# Patient Record
Sex: FEMALE | Race: WHITE | Employment: OTHER | URBAN - METROPOLITAN AREA
[De-identification: names, ages, dates, MRNs, and addresses within clinical notes are randomized per-mention and may not be internally consistent; named-entity substitution may affect disease eponyms.]

---

## 2017-01-10 ENCOUNTER — TELEPHONE (OUTPATIENT)
Dept: ENDOCRINOLOGY | Age: 77
End: 2017-01-10

## 2017-01-10 ENCOUNTER — HOSPITAL ENCOUNTER (OUTPATIENT)
Dept: LAB | Age: 77
Discharge: HOME | End: 2017-01-10
Payer: MEDICARE

## 2017-01-10 DIAGNOSIS — E11.65 UNCONTROLLED TYPE 2 DIABETES MELLITUS WITH HYPERGLYCEMIA, WITH LONG-TERM CURRENT USE OF INSULIN (HCC): Primary | ICD-10-CM

## 2017-01-10 DIAGNOSIS — Z79.4 UNCONTROLLED TYPE 2 DIABETES MELLITUS WITH HYPERGLYCEMIA, WITH LONG-TERM CURRENT USE OF INSULIN (HCC): Primary | ICD-10-CM

## 2017-01-10 PROCEDURE — 80053 COMPREHEN METABOLIC PANEL: CPT

## 2017-01-10 PROCEDURE — 36415 COLL VENOUS BLD VENIPUNCTURE: CPT

## 2017-01-10 PROCEDURE — 80061 LIPID PANEL: CPT

## 2017-01-10 PROCEDURE — 83036 HEMOGLOBIN GLYCOSYLATED A1C: CPT

## 2017-01-10 PROCEDURE — 82043 UR ALBUMIN QUANTITATIVE: CPT

## 2017-01-11 LAB
ALBUMIN SERPL-MCNC: 3.6 G/DL (ref 3.5–4.8)
ALBUMIN/CREAT UR: 34.2 MG/G CREAT (ref 0–30)
ALBUMIN/GLOB SERPL: 0.9 {RATIO} (ref 1.1–2.5)
ALP SERPL-CCNC: 98 IU/L (ref 39–117)
ALT SERPL-CCNC: 15 IU/L (ref 0–32)
AST SERPL-CCNC: 22 IU/L (ref 0–40)
BILIRUB SERPL-MCNC: 0.4 MG/DL (ref 0–1.2)
BUN SERPL-MCNC: 16 MG/DL (ref 8–27)
BUN/CREAT SERPL: 16 (ref 11–26)
CALCIUM SERPL-MCNC: 9.4 MG/DL (ref 8.7–10.3)
CHLORIDE SERPL-SCNC: 97 MMOL/L (ref 96–106)
CHOLEST SERPL-MCNC: 204 MG/DL (ref 100–199)
CO2 SERPL-SCNC: 27 MMOL/L (ref 18–29)
CREAT SERPL-MCNC: 1.03 MG/DL (ref 0.57–1)
CREAT UR-MCNC: 126.8 MG/DL
EST. AVERAGE GLUCOSE BLD GHB EST-MCNC: 252 MG/DL
GLOBULIN SER CALC-MCNC: 3.8 G/DL (ref 1.5–4.5)
GLUCOSE SERPL-MCNC: 269 MG/DL (ref 65–99)
HBA1C MFR BLD: 10.4 % (ref 4.8–5.6)
HDLC SERPL-MCNC: 38 MG/DL
INTERPRETATION, 910389: NORMAL
INTERPRETATION: NORMAL
LDLC SERPL CALC-MCNC: 139 MG/DL (ref 0–99)
MICROALBUMIN UR-MCNC: 43.4 UG/ML
POTASSIUM SERPL-SCNC: 5.7 MMOL/L (ref 3.5–5.2)
PROT SERPL-MCNC: 7.4 G/DL (ref 6–8.5)
SODIUM SERPL-SCNC: 139 MMOL/L (ref 134–144)
TRIGL SERPL-MCNC: 133 MG/DL (ref 0–149)
VLDLC SERPL CALC-MCNC: 27 MG/DL (ref 5–40)

## 2017-01-17 ENCOUNTER — OFFICE VISIT (OUTPATIENT)
Dept: ENDOCRINOLOGY | Age: 77
End: 2017-01-17

## 2017-01-17 VITALS
OXYGEN SATURATION: 98 % | DIASTOLIC BLOOD PRESSURE: 76 MMHG | WEIGHT: 262 LBS | TEMPERATURE: 98.2 F | HEIGHT: 62 IN | HEART RATE: 76 BPM | SYSTOLIC BLOOD PRESSURE: 171 MMHG | RESPIRATION RATE: 16 BRPM | BODY MASS INDEX: 48.21 KG/M2

## 2017-01-17 DIAGNOSIS — Z79.4 TYPE 2 DIABETES MELLITUS WITH HYPERGLYCEMIA, WITH LONG-TERM CURRENT USE OF INSULIN (HCC): Primary | ICD-10-CM

## 2017-01-17 DIAGNOSIS — E11.65 TYPE 2 DIABETES MELLITUS WITH HYPERGLYCEMIA, WITH LONG-TERM CURRENT USE OF INSULIN (HCC): Primary | ICD-10-CM

## 2017-01-17 DIAGNOSIS — E11.65 TYPE 2 DIABETES MELLITUS WITH HYPERGLYCEMIA, WITHOUT LONG-TERM CURRENT USE OF INSULIN (HCC): ICD-10-CM

## 2017-01-17 DIAGNOSIS — Z79.4 UNCONTROLLED TYPE 2 DIABETES MELLITUS WITH HYPERGLYCEMIA, WITH LONG-TERM CURRENT USE OF INSULIN (HCC): ICD-10-CM

## 2017-01-17 DIAGNOSIS — E11.65 UNCONTROLLED TYPE 2 DIABETES MELLITUS WITH HYPERGLYCEMIA, WITH LONG-TERM CURRENT USE OF INSULIN (HCC): ICD-10-CM

## 2017-01-17 DIAGNOSIS — E11.00 UNCONTROLLED TYPE 2 DIABETES MELLITUS WITH HYPEROSMOLARITY WITHOUT COMA, UNSPECIFIED LONG TERM INSULIN USE STATUS: ICD-10-CM

## 2017-01-17 DIAGNOSIS — I10 ESSENTIAL HYPERTENSION: ICD-10-CM

## 2017-01-17 RX ORDER — METFORMIN HYDROCHLORIDE 1000 MG/1
1000 TABLET ORAL 2 TIMES DAILY WITH MEALS
Qty: 60 TAB | Refills: 5 | Status: SHIPPED | OUTPATIENT
Start: 2017-01-17 | End: 2017-12-13 | Stop reason: SDUPTHER

## 2017-01-17 NOTE — PROGRESS NOTES
Eye exam: 4 years ago  Foot exam: not within last year    Lab Results   Component Value Date/Time    Hemoglobin A1c 10.4 01/10/2017 09:29 AM    Hemoglobin A1c (POC) 10.6 09/01/2016 10:08 AM    Hemoglobin A1c, External 9.3 12/02/2015     Wt Readings from Last 3 Encounters:   01/17/17 262 lb (118.8 kg)   10/14/16 250 lb 6.4 oz (113.6 kg)   09/01/16 252 lb (114.3 kg)     Temp Readings from Last 3 Encounters:   01/17/17 98.2 °F (36.8 °C) (Oral)   10/14/16 95.7 °F (35.4 °C) (Oral)   09/01/16 96.3 °F (35.7 °C)     BP Readings from Last 3 Encounters:   01/17/17 171/76   10/14/16 147/77   09/01/16 153/66     Pulse Readings from Last 3 Encounters:   01/17/17 76   10/14/16 72   09/01/16 72

## 2017-01-17 NOTE — PATIENT INSTRUCTIONS
Start Truliicty 0.75 mg weekly     Start Metformin    Levemir 60 units in AM and 60 units at bedtime    Check sugars 4 times a day     Stop Humalog     Call in 2 weeks with sugar readings     Bring medicine bottles to your visit

## 2017-01-17 NOTE — MR AVS SNAPSHOT
Visit Information Date & Time Provider Department Dept. Phone Encounter #  
 1/17/2017 11:15 AM Hallie Walls MD [facility] [phone] [id number] Follow-up Instructions Return in about 2 months (around 3/17/2017). Upcoming Health Maintenance Date Due  
 FOOT EXAM Q1 8/22/1950 EYE EXAM RETINAL OR DILATED Q1 8/22/1952 DTaP/Tdap/Td series (1 - Tdap) 8/22/1963 FOBT Q 1 YEAR AGE 50-75 8/22/1992 ZOSTER VACCINE AGE 60> 8/22/2002 GLAUCOMA SCREENING Q2Y 8/22/2007 OSTEOPOROSIS SCREENING (DEXA) 8/22/2007 Pneumococcal 65+ Low/Medium Risk (1 of 2 - PCV13) 8/22/2007 MEDICARE YEARLY EXAM 8/22/2007 INFLUENZA AGE 9 TO ADULT 8/1/2016 HEMOGLOBIN A1C Q6M 7/10/2017 MICROALBUMIN Q1 1/10/2018 LIPID PANEL Q1 1/10/2018 Allergies as of 1/17/2017  Review Complete On: 1/17/2017 By: Hallie Walls MD  
  
 Severity Noted Reaction Type Reactions Lortab [Hydrocodone-acetaminophen] High 09/17/2014    Other (comments)  
 hallucinations Phenergan [Promethazine] High 01/27/2015    Other (comments) Hallucination Cephalexin  05/16/2016    Vertigo Oxycodone  09/17/2014    Other (comments)  
 hallucination Current Immunizations  Never Reviewed No immunizations on file. Not reviewed this visit You Were Diagnosed With   
  
 Codes Comments Type 2 diabetes mellitus with hyperglycemia, with long-term current use of insulin (HCC)    -  Primary ICD-10-CM: E11.65, Z79.4 ICD-9-CM: 250.00, 790.29, V58.67 Essential hypertension     ICD-10-CM: I10 
ICD-9-CM: 401.9 Vitals BP Pulse Temp Resp Height(growth percentile) Weight(growth percentile) 171/76 (BP 1 Location: Right arm, BP Patient Position: Sitting) 76 98.2 °F (36.8 °C) (Oral) 16 5' 2\" (1.575 m) 262 lb (118.8 kg) SpO2 BMI OB Status Smoking Status 98% 47.92 kg/m2 Menopause Never Smoker Vitals History BMI and BSA Data Body Mass Index Body Surface Area  
 47.92 kg/m 2 2.28 m 2 Preferred Pharmacy Pharmacy Name Phone [facility], [address] Your Updated Medication List  
  
   
This list is accurate as of: 1/17/17 12:05 PM.  Always use your most recent med list.  
  
  
  
  
 ASPERCREME 10 % lotion Generic drug:  trolamine salicylate Apply  to affected area. aspirin delayed-release 81 mg tablet Take 81 mg by mouth daily. BD INSULIN PEN NEEDLE UF MINI 31 gauge x 3/16\" Ndle Generic drug:  Insulin Needles (Disposable) USE TO INJECT INSULI SUBCUTANEOUSLY DAILY * Blood-Glucose Meter monitoring kit Test blood glucose 3 times daily * Blood-Glucose Meter monitoring kit Commonly known as:  Roger Mcardle Test blood glucose 3 times daily  
  
 carvedilol 3.125 mg tablet Commonly known as:  Abbott Servant Take  by mouth two (2) times daily (with meals). CENTRUM SILVER PO Take  by mouth. DIOVAN 80 mg tablet Generic drug:  valsartan Take 80 mg by mouth daily. dulaglutide 0.75 mg/0.5 mL sub-q pen Commonly known as:  TRULICITY  
0.5 mL by SubCUTAneous route every seven (7) days. furosemide 40 mg tablet Commonly known as:  LASIX Take 40 mg by mouth daily. glucose blood VI test strips strip Commonly known as:  ONETOUCH ULTRA TEST Test blood glucose 3 times daily Dx Code: E11.65  
  
 insulin detemir 100 unit/mL (3 mL) Inpn Commonly known as:  Winton Melter Inject 80 units at bedtime  
  
 insulin lispro 100 unit/mL kwikpen Commonly known as:  HumaLOG KwikPen Inject 25 units before each meal w/ SSI Max units daily: 111 Lancets Misc Test blood glucose 3 times daily  
  
 meclizine 25 mg tablet Commonly known as:  ANTIVERT Take  by mouth three (3) times daily as needed. metFORMIN 1,000 mg tablet Commonly known as:  GLUCOPHAGE  
 Take 1 Tab by mouth two (2) times daily (with meals). metoprolol succinate 25 mg XL tablet Commonly known as:  TOPROL-XL Take 25 mg by mouth daily. naproxen 250 mg tablet Commonly known as:  NAPROSYN Take 250 mg by mouth two (2) times daily as needed for Pain.  
  
 pravastatin 40 mg tablet Commonly known as:  PRAVACHOL Take 40 mg by mouth nightly. silver sulfADIAZINE 1 % topical cream  
Commonly known as:  SILVADENE Apply  to affected area daily. traMADol 50 mg tablet Commonly known as:  ULTRAM  
Take 50 mg by mouth every six (6) hours as needed for Pain. * Notice: This list has 2 medication(s) that are the same as other medications prescribed for you. Read the directions carefully, and ask your doctor or other care provider to review them with you. Follow-up Instructions Return in about 2 months (around 3/17/2017). Patient Instructions Start Truliicty 0.75 mg weekly Start Metformin Levemir 60 units in AM and 60 units at bedtime Check sugars 4 times a day Stop Humalog Call in 2 weeks with sugar readings Bring medicine bottles to your visit Introducing [facility]! [facility] introduces [employer] patient portal. Now you can access parts of your medical record, email your doctor's office, and request medication refills online. 1. In your internet browser, go to [email or web address]. [email or web address] 2. Click on the First Time User? Click Here link in the Sign In box. You will see the New Member Sign Up page. 3. Enter your [employer] Access Code exactly as it appears below. You will not need to use this code after youve completed the sign-up process. If you do not sign up before the expiration date, you must request a new code. · [employer] Access Code: [email or web address] Expires: 4/17/2017 12:05 PM 
 
4.  Enter the last four digits of your Social Security Number (xxxx) and Date of Birth (mm/dd/yyyy) as indicated and click Submit. You will be taken to the next sign-up page. 5. Create a [employer] ID. This will be your [employer] login ID and cannot be changed, so think of one that is secure and easy to remember. 6. Create a [employer] password. You can change your password at any time. 7. Enter your Password Reset Question and Answer. This can be used at a later time if you forget your password. 8. Enter your e-mail address. You will receive e-mail notification when new information is available in [address] 9. Click Sign Up. You can now view and download portions of your medical record. 10. Click the Download Summary menu link to download a portable copy of your medical information. If you have questions, please visit the Frequently Asked Questions section of the [employer] website. Remember, [employer] is NOT to be used for urgent needs. For medical emergencies, dial 911. Now available from your iPhone and Android! Please provide this summary of care documentation to your next provider. Your primary care clinician is listed as Juana Cox. If you have any questions after today's visit, please call [phone].

## 2017-01-17 NOTE — PROGRESS NOTES
[facility]               Jorge Andrew MD        [address] 78 [phone] Fax [phone] ( [facility]) [address] [address] [address] NR:[phone] Fax [phone] ( Monday)          Patient Information  Date:1/17/2017  Name : Eliot Douglas 78 y.o.     YOB: 1940         Referred by: Nancy Thompson NP         Chief Complaint   Patient presents with    Diabetes       History of Present Illness: Eliot Douglas is a 78 y.o. female here for follow up of  Type 2 Diabetes Mellitus. Last visit I had discussed starting Trulicity and sent prescription to pharmacy. Instructions given to continue Trulicity. She took it for one month and says that she did not know she had to continue it. She is not taking Levemir  whenever she feels good. She takes Levemir when she has cramping. Humalog should have been  30 units AC, she is taking 10 units as she has muscle cramps 30-60 minutes after taking Humalog. She reports having a lot of Humalog left in the house. She has always been taking less than that recommended. I reviewed the log and her blood glucose are in the 200-500 range. In the past, she had mentioned being unable to afford a healthy diet and then on a high carb diet. She has history of lymphedema and follows up with lymphedema clinic. No regular exercise.              Wt Readings from Last 3 Encounters:   01/17/17 262 lb (118.8 kg)   10/14/16 250 lb 6.4 oz (113.6 kg)   09/01/16 252 lb (114.3 kg)       BP Readings from Last 3 Encounters:   01/17/17 171/76   10/14/16 147/77   09/01/16 153/66           Past Medical History   Diagnosis Date    HTN (hypertension)     Hyperlipidemia     Hypothyroid     Lymphedema     Type II or unspecified type diabetes mellitus with neurological manifestations, uncontrolled      Current Outpatient Prescriptions   Medication Sig    insulin detemir (LEVEMIR FLEXTOUCH) 100 unit/mL (3 mL) inpn Inject 60 units BID    traMADol (ULTRAM) 50 mg tablet Take 50 mg by mouth every six (6) hours as needed for Pain.  meclizine (ANTIVERT) 25 mg tablet Take  by mouth three (3) times daily as needed.  trolamine salicylate (ASPERCREME) 10 % lotion Apply  to affected area.  FOLIC ACID/MULTIVIT-MIN/LUTEIN (CENTRUM SILVER PO) Take  by mouth.  glucose blood VI test strips (ONETOUCH ULTRA TEST) strip Test blood glucose 3 times daily Dx Code: E11.65    BD INSULIN PEN NEEDLE UF MINI 31 gauge x 3/16\" ndle USE TO INJECT INSULI SUBCUTANEOUSLY DAILY    naproxen (NAPROSYN) 250 mg tablet Take 250 mg by mouth two (2) times daily as needed for Pain.  Blood-Glucose Meter (ONE TOUCH ULTRAMINI) monitoring kit Test blood glucose 3 times daily    Blood-Glucose Meter monitoring kit Test blood glucose 3 times daily    Lancets misc Test blood glucose 3 times daily    furosemide (LASIX) 40 mg tablet Take 40 mg by mouth daily.  aspirin delayed-release 81 mg tablet Take 81 mg by mouth daily.  pravastatin (PRAVACHOL) 40 mg tablet Take 40 mg by mouth nightly.  metFORMIN (GLUCOPHAGE) 1,000 mg tablet Take 1 Tab by mouth two (2) times daily (with meals).  dulaglutide (TRULICITY) 8.94 PT/1.0 mL sub-q pen 0.5 mL by SubCUTAneous route every seven (7) days.  valsartan (DIOVAN) 80 mg tablet Take 80 mg by mouth daily. No current facility-administered medications for this visit.       Allergies   Allergen Reactions    Lortab [Hydrocodone-Acetaminophen] Other (comments)     hallucinations    Phenergan [Promethazine] Other (comments)     Hallucination    Cephalexin Vertigo    Oxycodone Other (comments)     hallucination         Review of Systems:  - Constitutional Symptoms: no fevers, no chills  - Eyes: no blurry vision no double vision  - Cardiovascular: no chest pain ,no palpitations  - Respiratory: no cough no shortness of breath  - Gastrointestinal: no dysphagia no  abdominal pain  - Musculoskeletal: no joint pains +  weakness  - Integumentary: no rashes  - Neurological: + numbness, tingling, no  headaches  -     Physical Examination:   Blood pressure 171/76, pulse 76, temperature 98.2 °F (36.8 °C), temperature source Oral, resp. rate 16, height 5' 2\" (1.575 m), weight 262 lb (118.8 kg), SpO2 98 %. Estimated body mass index is 47.92 kg/(m^2) as calculated from the following:    Height as of this encounter: 5' 2\" (1.575 m). -   Weight as of this encounter: 262 lb (118.8 kg). - General: pleasant, no distress, good eye contact,obese  - HEENT: no pallor, no periorbital edema, EOMI  - Neck: supple, no thyromegaly  - Cardiovascular: regular, normal rate, normal S1 and S2  - Respiratory: clear to auscultation bilaterally  - Gastrointestinal: soft, nontender, nondistended,  BS +  - Musculoskeletal:  no acanthosis nigricans,+ edema,   - Foot exam -:lymphedema   - Neurological: alert and oriented  - Psychiatric: normal mood and affect  - Skin: color, texture, turgor normal.       Data Reviewed:     [] Glucose records reviewed. [] See glucose records for details (to be scanned).   [] A1C  [x] Reviewed labs    Creatinine normal, A1 C  Lab Results   Component Value Date/Time    Hemoglobin A1c 10.4 01/10/2017 09:29 AM    Hemoglobin A1c 10.9 01/20/2015 09:52 AM    Glucose 269 01/10/2017 09:29 AM    Glucose (POC) 158 03/18/2015 12:08 PM    Glucose  09/01/2016 10:03 AM    Microalb/Creat ratio (ug/mg creat.) 34.2 01/10/2017 09:29 AM    LDL, calculated 139 01/10/2017 09:29 AM    Creatinine 1.03 01/10/2017 09:29 AM    Hemoglobin A1c, External 9.3 12/02/2015    Hemoglobin A1c, External 10.6 05/07/2015 12:39 PM      Lab Results   Component Value Date/Time    Cholesterol, total 204 01/10/2017 09:29 AM    HDL Cholesterol 38 01/10/2017 09:29 AM    LDL, calculated 139 01/10/2017 09:29 AM    Triglyceride 133 01/10/2017 09:29 AM     Lab Results   Component Value Date/Time    ALT 15 01/10/2017 09:29 AM AST 22 01/10/2017 09:29 AM    Alk. phosphatase 98 01/10/2017 09:29 AM    Bilirubin, total 0.4 01/10/2017 09:29 AM     Lab Results   Component Value Date/Time    GFR est AA 62 01/10/2017 09:29 AM    GFR est non-AA 54 01/10/2017 09:29 AM    Creatinine 1.03 01/10/2017 09:29 AM    BUN 16 01/10/2017 09:29 AM    Sodium 139 01/10/2017 09:29 AM    Potassium 5.7 01/10/2017 09:29 AM    Chloride 97 01/10/2017 09:29 AM    CO2 27 01/10/2017 09:29 AM      No results found for: TSH, TSHEXT     Assessment/Plan:     1. Type 2 diabetes mellitus with hyperglycemia, with long-term current use of insulin ([facility]    2. Essential hypertension    3. Uncontrolled type 2 diabetes mellitus with hyperosmolarity without coma, unspecified long term insulin use status ([facility]        1. Type 2 Diabetes Mellitus with nephropathy,uncontrolled  Lab Results   Component Value Date/Time    Hemoglobin A1c 10.4 01/10/2017 09:29 AM    Hemoglobin A1c (POC) 10.6 09/01/2016 10:08 AM    Hemoglobin A1c, External 9.3 12/02/2015       She has always been taking less Insulin than  recommended, I have always given printed instructions. Despite that, she does not follow the recommendations. She has not been taking Trulicity. She is taking Levemir only when she feels bad. Humalog was taken less than what was prescribed. She had nonspecific side effects to Humalog and did not want to be changed to Novolog. She agreed to try Trulicity once a week and Levemir twice daily. Discussed she needs an effort for better control. Metformin 1000 mg twice a day   Levemir 60 units BID   Stop Humalog  30 units TID  Start Trulicity - discussed side effects, discontinued Bydureon   Stressed the importance of following the recomendations  Check sugars 4 times a day   To call in 3 weeks      Advised to check glucose 4 times daily    2. HTN : Continue current therapy  Her BP was high here.  When discussed medications, she has stopped all three BP medications and does not know why it was stopped. Reports to be taking another BP medication which I do not have. Asked to bring the medication bottles with her visit every time. Given the list of medications she was on and asked her to make a new list of medications. Discussed the risk of stroke. She is a high risk patient and high risk for decompensation given noncompliance with the medication treatment. 3. Hyperlipidemia : Continue statin. 4.Obesity:Body mass index is 47.92 kg/(m^2). GLP1    5 PAD - PCI ,Lymphedema          Patient Instructions   Start Truliicty 0.75 mg weekly     Start Metformin    Levemir 60 units in AM and 60 units at bedtime    Check sugars 4 times a day     Stop Humalog     Call in 2 weeks with sugar readings     Bring medicine bottles to your visit     Follow-up Disposition:  Return in about 2 months (around 3/17/2017). Thank you for allowing me to participate in the care of this patient.     Dora Owens MD

## 2017-01-19 ENCOUNTER — TELEPHONE (OUTPATIENT)
Dept: ENDOCRINOLOGY | Age: 77
End: 2017-01-19

## 2017-01-19 NOTE — TELEPHONE ENCOUNTER
Patient called stating that her blood sugar was 181 this morning and wanted to know if she should take her insulin or not.

## 2017-01-19 NOTE — TELEPHONE ENCOUNTER
Informed pt that she can take her insulin if her BG is 189. Informed pt that low BG is considered 70 or below. Pt verbalized understanding.

## 2017-03-25 RX ORDER — PEN NEEDLE, DIABETIC 31 GX5/16"
NEEDLE, DISPOSABLE MISCELLANEOUS
Qty: 300 PEN NEEDLE | Refills: 11 | Status: SHIPPED | OUTPATIENT
Start: 2017-03-25 | End: 2017-07-17 | Stop reason: SDUPTHER

## 2017-05-08 RX ORDER — PEN NEEDLE, DIABETIC 31 GX5/16"
NEEDLE, DISPOSABLE MISCELLANEOUS
Qty: 300 PEN NEEDLE | Refills: 9 | Status: SHIPPED | OUTPATIENT
Start: 2017-05-08 | End: 2017-07-17 | Stop reason: SDUPTHER

## 2017-05-19 ENCOUNTER — TELEPHONE (OUTPATIENT)
Dept: ENDOCRINOLOGY | Age: 77
End: 2017-05-19

## 2017-05-19 NOTE — TELEPHONE ENCOUNTER
Informed pt's  Jeannette Geronimo that Dr David Holman wanted to inform Mrs Nato Rutledge to make sure she takes her metformin as prescribed daily and if her BG continues to increase she is to bring meter to office to be downloaded. Pt's  verbalized understanding.

## 2017-05-19 NOTE — TELEPHONE ENCOUNTER
Pt called stating her Bg was 195 this AM. She wanted to know what to do. Asked pt if she had taken her 60 units of Levemir this AM and she stated she has not. Asked pt to take her Levemir. When asked, pt states she does take Levemir twice daily, her metformin when she remembers and she did start Trulicity. She was calling to see if she needed to take extra units if Levemir due to her BG being 195.  Pt states she does not know how to get readings from meter

## 2017-05-19 NOTE — TELEPHONE ENCOUNTER
Ask her to take metformin consistently which will help insulin work better     Continue the same dose , if sugars are high need log to get a better idea

## 2017-07-03 ENCOUNTER — TELEPHONE (OUTPATIENT)
Dept: ENDOCRINOLOGY | Age: 77
End: 2017-07-03

## 2017-07-03 DIAGNOSIS — E11.65 TYPE 2 DIABETES MELLITUS WITH HYPERGLYCEMIA, WITH LONG-TERM CURRENT USE OF INSULIN (HCC): Primary | ICD-10-CM

## 2017-07-03 DIAGNOSIS — Z79.4 TYPE 2 DIABETES MELLITUS WITH HYPERGLYCEMIA, WITH LONG-TERM CURRENT USE OF INSULIN (HCC): Primary | ICD-10-CM

## 2017-07-03 RX ORDER — INSULIN DETEMIR 100 [IU]/ML
INJECTION, SOLUTION SUBCUTANEOUS
Qty: 30 ML | Refills: 0 | Status: SHIPPED | OUTPATIENT
Start: 2017-07-03 | End: 2017-09-06 | Stop reason: SDUPTHER

## 2017-07-03 RX ORDER — PEN NEEDLE, DIABETIC 31 GX5/16"
NEEDLE, DISPOSABLE MISCELLANEOUS
Qty: 300 PEN NEEDLE | Refills: 6 | Status: SHIPPED | OUTPATIENT
Start: 2017-07-03 | End: 2017-07-17 | Stop reason: SDUPTHER

## 2017-07-17 RX ORDER — PEN NEEDLE, DIABETIC 31 GX3/16"
NEEDLE, DISPOSABLE MISCELLANEOUS
Qty: 100 PEN NEEDLE | Refills: 11 | Status: SHIPPED | OUTPATIENT
Start: 2017-07-17 | End: 2018-01-01 | Stop reason: SDUPTHER

## 2017-07-17 NOTE — TELEPHONE ENCOUNTER
Patient says she is running out of pen needles for levemir sooner than prescription allows. Patient says she takes levemir twice daily and need at least 200 pen needles for 1 month supply. Send to [employer]. Patient says she is completely out and would like prescription sent before 5pm today(7/17/17).

## 2017-10-23 ENCOUNTER — OFFICE VISIT (OUTPATIENT)
Dept: ENDOCRINOLOGY | Age: 77
End: 2017-10-23

## 2017-10-23 VITALS
HEART RATE: 62 BPM | SYSTOLIC BLOOD PRESSURE: 165 MMHG | TEMPERATURE: 96.4 F | OXYGEN SATURATION: 97 % | HEIGHT: 62 IN | DIASTOLIC BLOOD PRESSURE: 62 MMHG | WEIGHT: 262 LBS | BODY MASS INDEX: 48.21 KG/M2 | RESPIRATION RATE: 16 BRPM

## 2017-10-23 DIAGNOSIS — E78.2 MIXED HYPERLIPIDEMIA: ICD-10-CM

## 2017-10-23 DIAGNOSIS — E11.65 TYPE 2 DIABETES MELLITUS WITH HYPERGLYCEMIA, WITH LONG-TERM CURRENT USE OF INSULIN (HCC): Primary | ICD-10-CM

## 2017-10-23 DIAGNOSIS — Z79.4 TYPE 2 DIABETES MELLITUS WITH HYPERGLYCEMIA, WITH LONG-TERM CURRENT USE OF INSULIN (HCC): Primary | ICD-10-CM

## 2017-10-23 DIAGNOSIS — I10 ESSENTIAL HYPERTENSION: ICD-10-CM

## 2017-10-23 LAB
GLUCOSE POC: 156 MG/DL
HBA1C MFR BLD HPLC: 9.3 %

## 2017-10-23 RX ORDER — CEPHALEXIN 500 MG/1
CAPSULE ORAL
COMMUNITY
End: 2018-02-26 | Stop reason: ALTCHOICE

## 2017-10-23 RX ORDER — METOPROLOL SUCCINATE 25 MG/1
TABLET, EXTENDED RELEASE ORAL
COMMUNITY
End: 2019-01-01 | Stop reason: ALTCHOICE

## 2017-10-23 NOTE — PROGRESS NOTES
Nathaniel Kaminski is a 78 y.o. female here for   Chief Complaint   Patient presents with    Diabetes       Functional glucose monitor and record keeping system? - yes  Eye exam within last year? - 5-6 yrs ago  Foot exam within last year? - Oct 2017    1. Have you been to the ER, urgent care clinic since your last visit? Hospitalized since your last visit? -no    2. Have you seen or consulted any other health care providers outside of the [address] [place] [address] since your last visit? Include any pap smears or colon screening. -PCP      Lab Results   Component Value Date/Time    Hemoglobin A1c 10.4 01/10/2017 09:29 AM    Hemoglobin A1c (POC) 10.6 09/01/2016 10:08 AM    Hemoglobin A1c, External 9.3 12/02/2015       Wt Readings from Last 3 Encounters:   01/17/17 262 lb (118.8 kg)   10/14/16 250 lb 6.4 oz (113.6 kg)   09/01/16 252 lb (114.3 kg)     Temp Readings from Last 3 Encounters:   01/17/17 98.2 °F (36.8 °C) (Oral)   10/14/16 95.7 °F (35.4 °C) (Oral)   09/01/16 96.3 °F (35.7 °C)     BP Readings from Last 3 Encounters:   01/17/17 171/76   10/14/16 147/77   09/01/16 153/66     Pulse Readings from Last 3 Encounters:   01/17/17 76   10/14/16 72   09/01/16 72

## 2017-10-23 NOTE — PROGRESS NOTES
[facility]               Tonja Tate MD        [address] 78 444 74 66 Fax [phone]  [facility]) [address] [address] [address] Z Fax [phone] ( Monday)          Patient Information  Date:10/23/2017  Name : Isaura Bob 78 y.o.     YOB: 1940         Referred by: Tiffanie Lazo NP         Chief Complaint   Patient presents with    Diabetes       History of Present Illness: Isaura Bob is a 78 y.o. female here for follow up of  Type 2 Diabetes Mellitus. Last visit I had discussed starting Trulicity and sent prescription to pharmacy. Instructions given to continue Trulicity. She took it for one month and says that she did not know she had to continue it. She is not taking Levemir  whenever she feels good. She takes Levemir when she has cramping. Humalog should have been  30 units AC, she is taking 10 units as she has muscle cramps 30-60 minutes after taking Humalog. She reports having a lot of Humalog left in the house. She has always been taking less than that recommended. I reviewed the log and her blood glucose are in the 200-500 range. In the past, she had mentioned being unable to afford a healthy diet and then on a high carb diet. She has history of lymphedema and follows up with lymphedema clinic. No regular exercise.              Wt Readings from Last 3 Encounters:   10/23/17 262 lb (118.8 kg)   17 262 lb (118.8 kg)   10/14/16 250 lb 6.4 oz (113.6 kg)       BP Readings from Last 3 Encounters:   10/23/17 165/62   17 171/76   10/14/16 147/77           Past Medical History:   Diagnosis Date    HTN (hypertension)     Hyperlipidemia     Hypothyroid     Lymphedema     Type II or unspecified type diabetes mellitus with neurological manifestations, uncontrolled(250.62) (HCC)      Current Outpatient Prescriptions   Medication Sig    METOLAZONE PO Take by mouth.  cephALEXin (KEFLEX) 500 mg capsule Take 500 mg by mouth BID    metoprolol succinate (TOPROL-XL) 25 mg XL tablet Take 25 mg by mouth daily    Insulin Needles, Disposable, (BD INSULIN PEN NEEDLE UF MINI) 31 gauge x 3/16\" ndle Use to inject Levemir twice daily. Dx code E11.65    metFORMIN (GLUCOPHAGE) 1,000 mg tablet Take 1 Tab by mouth two (2) times daily (with meals).  dulaglutide (TRULICITY) 3.72 AJ/0.2 mL sub-q pen 0.5 mL by SubCUTAneous route every seven (7) days.  insulin detemir (LEVEMIR FLEXTOUCH) 100 unit/mL (3 mL) inpn Inject 60 units BID (Patient taking differently: 65 Units by SubCUTAneous route two (2) times a day. Inject 60 units BID)    traMADol (ULTRAM) 50 mg tablet Take 50 mg by mouth every six (6) hours as needed for Pain.  meclizine (ANTIVERT) 25 mg tablet Take  by mouth three (3) times daily as needed.  trolamine salicylate (ASPERCREME) 10 % lotion Apply  to affected area.  glucose blood VI test strips (ONETOUCH ULTRA TEST) strip Test blood glucose 3 times daily Dx Code: E11.65    naproxen (NAPROSYN) 250 mg tablet Take 250 mg by mouth two (2) times daily as needed for Pain.  Blood-Glucose Meter (ONE TOUCH ULTRAMINI) monitoring kit Test blood glucose 3 times daily    Blood-Glucose Meter monitoring kit Test blood glucose 3 times daily    Lancets misc Test blood glucose 3 times daily    furosemide (LASIX) 40 mg tablet Take 40 mg by mouth daily.  aspirin delayed-release 81 mg tablet Take 81 mg by mouth daily.  pravastatin (PRAVACHOL) 40 mg tablet Take 40 mg by mouth nightly.  LEVEMIR FLEXTOUCH 100 unit/mL (3 mL) inpn INJECT 80 UNITS SUBCUTANEOUSLY AT BEDTIME . Rouse Junk Rouse Junk THIS  IS  A  DOSE  CHANGE. ..    FOLIC ACID/MULTIVIT-MIN/LUTEIN (CENTRUM SILVER PO) Take  by mouth.  valsartan (DIOVAN) 80 mg tablet Take 80 mg by mouth daily. No current facility-administered medications for this visit.       Allergies   Allergen Reactions    Lortab [Hydrocodone-Acetaminophen] Other (comments)     hallucinations    Phenergan [Promethazine] Other (comments)     Hallucination    Cephalexin Vertigo    Oxycodone Other (comments)     hallucination         Review of Systems:  - Constitutional Symptoms: no fevers, no chills  - Eyes: no blurry vision no double vision  - Cardiovascular: no chest pain ,no palpitations  - Respiratory: no cough no shortness of breath  - Gastrointestinal: no dysphagia no  abdominal pain  - Musculoskeletal: no joint pains +  weakness  - Integumentary: no rashes  - Neurological: + numbness, tingling, no  headaches  -     Physical Examination:   Blood pressure 165/62, pulse 62, temperature 96.4 °F (35.8 °C), temperature source Oral, resp. rate 16, height 5' 2\" (1.575 m), weight 262 lb (118.8 kg), SpO2 97 %. Estimated body mass index is 47.92 kg/(m^2) as calculated from the following:    Height as of this encounter: 5' 2\" (1.575 m). -   Weight as of this encounter: 262 lb (118.8 kg). - General: pleasant, no distress, good eye contact,obese  - HEENT: no pallor, no periorbital edema, EOMI  - Neck: supple, no thyromegaly  - Cardiovascular: regular, normal rate, normal S1 and S2  - Respiratory: clear to auscultation bilaterally  - Gastrointestinal: soft, nontender, nondistended,  BS +  - Musculoskeletal:  no acanthosis nigricans,+ edema,   - Foot exam -:lymphedema   - Neurological: alert and oriented  - Psychiatric: normal mood and affect  - Skin: color, texture, turgor normal.       Data Reviewed:     [] Glucose records reviewed. [] See glucose records for details (to be scanned).   [] A1C  [x] Reviewed labs    Creatinine normal, A1 C  Lab Results   Component Value Date/Time    Hemoglobin A1c 10.4 01/10/2017 09:29 AM    Hemoglobin A1c 10.9 01/20/2015 09:52 AM    Glucose 269 01/10/2017 09:29 AM    Glucose (POC) 158 03/18/2015 12:08 PM    Glucose  09/01/2016 10:03 AM    Microalb/Creat ratio (ug/mg creat.) 34.2 01/10/2017 09:29 AM    LDL, calculated 139 01/10/2017 09:29 AM    Creatinine 1.03 01/10/2017 09:29 AM    Hemoglobin A1c, External 9.3 12/02/2015    Hemoglobin A1c, External 10.6 05/07/2015 12:39 PM      Lab Results   Component Value Date/Time    Cholesterol, total 204 01/10/2017 09:29 AM    HDL Cholesterol 38 01/10/2017 09:29 AM    LDL, calculated 139 01/10/2017 09:29 AM    Triglyceride 133 01/10/2017 09:29 AM     Lab Results   Component Value Date/Time    ALT (SGPT) 15 01/10/2017 09:29 AM    AST (SGOT) 22 01/10/2017 09:29 AM    Alk. phosphatase 98 01/10/2017 09:29 AM    Bilirubin, total 0.4 01/10/2017 09:29 AM     Lab Results   Component Value Date/Time    GFR est AA 62 01/10/2017 09:29 AM    GFR est non-AA 54 01/10/2017 09:29 AM    Creatinine 1.03 01/10/2017 09:29 AM    BUN 16 01/10/2017 09:29 AM    Sodium 139 01/10/2017 09:29 AM    Potassium 5.7 01/10/2017 09:29 AM    Chloride 97 01/10/2017 09:29 AM    CO2 27 01/10/2017 09:29 AM      No results found for: TSH, TSHEXT     Assessment/Plan:     1. Type 2 diabetes mellitus with hyperglycemia, with long-term current use of insulin ([address]    2. Mixed hyperlipidemia    3. Essential hypertension        1. Type 2 Diabetes Mellitus with nephropathy,uncontrolled  Lab Results   Component Value Date/Time    Hemoglobin A1c 10.4 01/10/2017 09:29 AM    Hemoglobin A1c (POC) 10.6 09/01/2016 10:08 AM    Hemoglobin A1c, External 9.3 12/02/2015       She has always been taking less Insulin than  recommended, I have always given printed instructions. Despite that, she does not follow the recommendations. She has not been taking Trulicity. She is taking Levemir only when she feels bad. Humalog was taken less than what was prescribed. She had nonspecific side effects to Humalog and did not want to be changed to Novolog. She agreed to try Trulicity once a week and Levemir twice daily. Discussed she needs an effort for better control.       Metformin 1000 mg twice a day   Levemir 60 units BID   Stop Humalog  30 units TID  Start Trulicity - discussed side effects, discontinued Bydureon   Stressed the importance of following the recomendations  Check sugars 4 times a day   To call in 3 weeks      Advised to check glucose 4 times daily    2. HTN : Continue current therapy  Her BP was high here. When discussed medications, she has stopped all three BP medications and does not know why it was stopped. Reports to be taking another BP medication which I do not have. Asked to bring the medication bottles with her visit every time. Given the list of medications she was on and asked her to make a new list of medications. Discussed the risk of stroke. She is a high risk patient and high risk for decompensation given noncompliance with the medication treatment. 3. Hyperlipidemia : Continue statin. 4.Obesity:Body mass index is 47.92 kg/(m^2). GLP1    5 PAD - PCI ,Lymphedema          There are no Patient Instructions on file for this visit. Follow-up Disposition: Not on File    Thank you for allowing me to participate in the care of this patient.     So Palacio MD

## 2017-10-23 NOTE — PROGRESS NOTES
Vincent Hightower MD          Patient Information  Date:10/23/2017  Name : Armand Falnnery 78 y.o.     YOB: 1940         Referred by: Kareen Nobles NP         Chief Complaint   Patient presents with    Diabetes       History of Present Illness: Armand Flannery is a 78 y.o. female here for follow up of  Type 2 Diabetes Mellitus. She has nonspecific side effects to multiple medications, Humalog was discontinued and Trulicity was started. She is taking Trulicity now consistently, Levemir she mostly takes in the morning and evenings and sometimes she forgets. Blood glucose has improved to some extent. A1c is also improved, but not at goal.     She has preulcerative callus and needs surgery, waiting for clearance from the cardiologist. She has infection and intermittently on antibiotics. Blood glucose is fluctuating a lot and sometimes infection can cause severe hyperglycemia. In the past, she had mentioned being unable to afford a healthy diet and then on a high carb diet. She has history of lymphedema and follows up with lymphedema clinic. No regular exercise. 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     cephALEXin (KEFLEX) 500 mg capsule Take 500 mg by mouth BID    metoprolol succinate (TOPROL-XL) 25 mg XL tablet Take 25 mg by mouth daily    Insulin Needles, Disposable, (BD INSULIN PEN NEEDLE UF MINI) 31 gauge x 3/16\" ndle Use to inject Levemir twice daily. Dx code E11.65    metFORMIN (GLUCOPHAGE) 1,000 mg tablet Take 1 Tab by mouth two (2) times daily (with meals).  dulaglutide (TRULICITY) 3.94 VK/2.4 mL sub-q pen 0.5 mL by SubCUTAneous route every seven (7) days.  insulin detemir (LEVEMIR FLEXTOUCH) 100 unit/mL (3 mL) inpn Inject 60 units BID (Patient taking differently: 65 Units by SubCUTAneous route two (2) times a day. Inject 60 units BID)    traMADol (ULTRAM) 50 mg tablet Take 50 mg by mouth every six (6) hours as needed for Pain.  meclizine (ANTIVERT) 25 mg tablet Take  by mouth three (3) times daily as needed.  trolamine salicylate (ASPERCREME) 10 % lotion Apply  to affected area.  glucose blood VI test strips (ONETOUCH ULTRA TEST) strip Test blood glucose 3 times daily Dx Code: E11.65    naproxen (NAPROSYN) 250 mg tablet Take 250 mg by mouth two (2) times daily as needed for Pain.  Blood-Glucose Meter (ONE TOUCH ULTRAMINI) monitoring kit Test blood glucose 3 times daily    Blood-Glucose Meter monitoring kit Test blood glucose 3 times daily    Lancets misc Test blood glucose 3 times daily    furosemide (LASIX) 40 mg tablet Take 40 mg by mouth daily.  aspirin delayed-release 81 mg tablet Take 81 mg by mouth daily.  pravastatin (PRAVACHOL) 40 mg tablet Take 40 mg by mouth nightly.  LEVEMIR FLEXTOUCH 100 unit/mL (3 mL) inpn INJECT 80 UNITS SUBCUTANEOUSLY AT BEDTIME . Ernesto Spies Ernesto Spies THIS  IS  A  DOSE  CHANGE. ..    FOLIC ACID/MULTIVIT-MIN/LUTEIN (CENTRUM SILVER PO) Take  by mouth.  valsartan (DIOVAN) 80 mg tablet Take 80 mg by mouth daily. No current facility-administered medications for this visit.       Allergies   Allergen Reactions    Lortab [Hydrocodone-Acetaminophen] Other (comments)     hallucinations    Phenergan [Promethazine] Other (comments)     Hallucination    Cephalexin Vertigo    Oxycodone Other (comments)     hallucination         Review of Systems:  - Constitutional Symptoms: no fevers, no chills  - Eyes: no blurry vision no double vision  - Cardiovascular: no chest pain ,no palpitations  - Respiratory: no cough no shortness of breath  - Gastrointestinal: no dysphagia no  abdominal pain  - Musculoskeletal: no joint pains +  weakness  - Integumentary: no rashes  - Neurological: + numbness, tingling, no  headaches  -     Physical Examination:   Blood pressure 165/62, pulse 62, temperature 96.4 °F (35.8 °C), temperature source Oral, resp. rate 16, height 5' 2\" (1.575 m), weight 262 lb (118.8 kg), SpO2 97 %. Estimated body mass index is 47.92 kg/(m^2) as calculated from the following:    Height as of this encounter: 5' 2\" (1.575 m). -   Weight as of this encounter: 262 lb (118.8 kg). - General: pleasant, no distress, good eye contact,obese  - HEENT: no pallor, no periorbital edema, EOMI  - Neck: supple, no thyromegaly  - Cardiovascular: regular, normal rate, normal S1 and S2  - Respiratory: clear to auscultation bilaterally  - Gastrointestinal: soft, nontender, nondistended,  BS +  - Musculoskeletal:  no acanthosis nigricans,+ edema,   - Foot exam -:lymphedema , right callus - covered with   - Neurological: alert and oriented  - Psychiatric: normal mood and affect  - Skin: color, texture, turgor normal.       Data Reviewed:     [] Glucose records reviewed. [] See glucose records for details (to be scanned).   [] A1C  [x] Reviewed labs    Creatinine normal, A1 C  Lab Results   Component Value Date/Time    Hemoglobin A1c 10.4 01/10/2017 09:29 AM    Hemoglobin A1c 10.9 01/20/2015 09:52 AM    Glucose 269 01/10/2017 09:29 AM    Glucose (POC) 158 03/18/2015 12:08 PM    Glucose  10/23/2017 04:05 PM    Microalb/Creat ratio (ug/mg creat.) 34.2 01/10/2017 09:29 AM    LDL, calculated 139 01/10/2017 09:29 AM    Creatinine 1.03 01/10/2017 09:29 AM    Hemoglobin A1c, External 9.3 12/02/2015    Hemoglobin A1c, External 10.6 05/07/2015 12:39 PM      Lab Results   Component Value Date/Time Cholesterol, total 204 01/10/2017 09:29 AM    HDL Cholesterol 38 01/10/2017 09:29 AM    LDL, calculated 139 01/10/2017 09:29 AM    Triglyceride 133 01/10/2017 09:29 AM     Lab Results   Component Value Date/Time    ALT (SGPT) 15 01/10/2017 09:29 AM    AST (SGOT) 22 01/10/2017 09:29 AM    Alk. phosphatase 98 01/10/2017 09:29 AM    Bilirubin, total 0.4 01/10/2017 09:29 AM     Lab Results   Component Value Date/Time    GFR est AA 62 01/10/2017 09:29 AM    GFR est non-AA 54 01/10/2017 09:29 AM    Creatinine 1.03 01/10/2017 09:29 AM    BUN 16 01/10/2017 09:29 AM    Sodium 139 01/10/2017 09:29 AM    Potassium 5.7 01/10/2017 09:29 AM    Chloride 97 01/10/2017 09:29 AM    CO2 27 01/10/2017 09:29 AM      No results found for: TSH, TSHEXT     Assessment/Plan:     1. Type 2 diabetes mellitus with hyperglycemia, with long-term current use of insulin ([facility] [address]    2. Mixed hyperlipidemia    3. Essential hypertension        1. Type 2 Diabetes Mellitus with nephropathy,uncontrolled  Lab Results   Component Value Date/Time    Hemoglobin A1c 10.4 01/10/2017 09:29 AM    Hemoglobin A1c (POC) 9.3 10/23/2017 04:05 PM    Hemoglobin A1c, External 9.3 12/02/2015       She has always been taking less Insulin than recommended. She was taking Levemir only when she feels bad. She had nonspecific side effects to Humalog and did not want to be changed to Novolog. Continue  Trulicity once a week and Levemir twice daily. Discussed she needs an effort for better control. Metformin 1000 mg twice a day   Levemir 75 units BID   Stop Humalog  30 units TID        Advised to check glucose 4 times daily    2. HTN : Continue current therapy  Her BP was high here. When discussed medications, she has stopped all three BP medications and does not know why it was stopped. Reports to be taking another BP medication which I do not have. Asked to bring the medication bottles with her visit every time.   Given the list of medications she was on and asked her to make a new list of medications. Discussed the risk of stroke. She is a high risk patient and high risk for decompensation given noncompliance with the medication treatment. 3. Hyperlipidemia : Continue statin. 4.Obesity:Body mass index is 47.92 kg/(m^2). GLP1    5 PAD - PCI ,Lymphedema          There are no Patient Instructions on file for this visit. Follow-up Disposition: Not on File    Thank you for allowing me to participate in the care of this patient.     Ilya Samuel MD

## 2017-10-23 NOTE — MR AVS SNAPSHOT
Visit Information Date & Time Provider Department Dept. Phone Encounter #  
 10/23/2017  3:30 PM Dante Kent MD [facility] [phone] [id number] Follow-up Instructions Return in about 3 months (around 1/23/2018). Your Appointments 2/13/2018  2:45 PM  
ROUTINE CARE with Dante Kent MD  
[facility] Appt Note: 4 mon fu  
 [address] [facility] [address]  
[phone]  
  
   
 Graham Sharma [address] [place] [id number] Upcoming Health Maintenance Date Due  
 FOOT EXAM Q1 8/22/1950 EYE EXAM RETINAL OR DILATED Q1 8/22/1952 DTaP/Tdap/Td series (1 - Tdap) 8/22/1963 ZOSTER VACCINE AGE 60> 6/22/2002 GLAUCOMA SCREENING Q2Y 8/22/2007 OSTEOPOROSIS SCREENING (DEXA) 8/22/2007 Pneumococcal 65+ Low/Medium Risk (1 of 2 - PCV13) 8/22/2007 MEDICARE YEARLY EXAM 8/22/2007 HEMOGLOBIN A1C Q6M 7/10/2017 INFLUENZA AGE 9 TO ADULT 8/1/2017 MICROALBUMIN Q1 1/10/2018 LIPID PANEL Q1 1/10/2018 Allergies as of 10/23/2017  Review Complete On: 10/23/2017 By: Dante Kent MD  
  
 Severity Noted Reaction Type Reactions Lortab [Hydrocodone-acetaminophen] High 09/17/2014    Other (comments)  
 hallucinations Phenergan [Promethazine] High 01/27/2015    Other (comments) Hallucination Cephalexin  05/16/2016    Vertigo Oxycodone  09/17/2014    Other (comments)  
 hallucination Current Immunizations  Never Reviewed No immunizations on file. Not reviewed this visit You Were Diagnosed With   
  
 Codes Comments Type 2 diabetes mellitus with hyperglycemia, with long-term current use of insulin (HCC)    -  Primary ICD-10-CM: E11.65, Z79.4 ICD-9-CM: 250.00, 790.29, V58.67 Mixed hyperlipidemia     ICD-10-CM: E78.2 ICD-9-CM: 272.2 Essential hypertension     ICD-10-CM: I10 
ICD-9-CM: 401.9 Vitals BP Pulse Temp Resp Height(growth percentile) Weight(growth percentile)  
 165/62 (BP 1 Location: Right arm, BP Patient Position: Sitting) 62 96.4 °F (35.8 °C) (Oral) 16 5' 2\" (1.575 m) 262 lb (118.8 kg) SpO2 BMI OB Status Smoking Status 97% 47.92 kg/m2 Menopause Never Smoker Vitals History BMI and BSA Data Body Mass Index Body Surface Area  
 47.92 kg/m 2 2.28 m 2 Preferred Pharmacy Pharmacy Name [facility] [place], [address] [place] [address] Your Updated Medication List  
  
   
This list is accurate as of: 10/23/17  4:34 PM.  Always use your most recent med list.  
  
  
  
  
 ASPERCREME 10 % lotion Generic drug:  trolamine salicylate Apply  to affected area. aspirin delayed-release 81 mg tablet Take 81 mg by mouth daily. * Blood-Glucose Meter monitoring kit Test blood glucose 3 times daily * Blood-Glucose Meter monitoring kit Commonly known as:  Benito Raider Test blood glucose 3 times daily CENTRUM SILVER PO Take  by mouth. cephALEXin 500 mg capsule Commonly known as:  Cait Rota Take 500 mg by mouth BID  
  
 DIOVAN 80 mg tablet Generic drug:  valsartan Take 80 mg by mouth daily. dulaglutide 0.75 mg/0.5 mL sub-q pen Commonly known as:  TRULICITY  
0.5 mL by SubCUTAneous route every seven (7) days. furosemide 40 mg tablet Commonly known as:  LASIX Take 40 mg by mouth daily. glucose blood VI test strips strip Commonly known as:  ONETOUCH ULTRA TEST Test blood glucose 3 times daily Dx Code: E11.65  
  
 * insulin detemir 100 unit/mL (3 mL) Inpn Commonly known as:  Kamari Yulan Inject 60 units BID * LEVEMIR FLEXTOUCH 100 unit/mL (3 mL) Inpn Generic drug:  insulin detemir INJECT 80 UNITS SUBCUTANEOUSLY AT BEDTIME . Kimberly Galarza THIS  IS  A  DOSE  CHANGE. .. Insulin Needles (Disposable) 31 gauge x 3/16\" Ndle Commonly known as:  BD INSULIN PEN NEEDLE UF MINI Use to inject Levemir twice daily. Dx code E11.65 Lancets Misc Test blood glucose 3 times daily  
  
 meclizine 25 mg tablet Commonly known as:  ANTIVERT Take  by mouth three (3) times daily as needed. metFORMIN 1,000 mg tablet Commonly known as:  GLUCOPHAGE Take 1 Tab by mouth two (2) times daily (with meals). METOLAZONE PO Take  by mouth.  
  
 metoprolol succinate 25 mg XL tablet Commonly known as:  TOPROL-XL Take 25 mg by mouth daily  
  
 naproxen 250 mg tablet Commonly known as:  NAPROSYN Take 250 mg by mouth two (2) times daily as needed for Pain.  
  
 pravastatin 40 mg tablet Commonly known as:  PRAVACHOL Take 40 mg by mouth nightly. traMADol 50 mg tablet Commonly known as:  ULTRAM  
Take 50 mg by mouth every six (6) hours as needed for Pain. * Notice: This list has 4 medication(s) that are the same as other medications prescribed for you. Read the directions carefully, and ask your doctor or other care provider to review them with you. We Performed the Following AMB POC GLUCOSE, QUANTITATIVE, BLOOD [49562 CPT(R)] AMB POC HEMOGLOBIN A1C [10695 CPT(R)] Follow-up Instructions Return in about 3 months (around 1/23/2018). Introducing [facility]! Felicia Kent introduces [employer] patient portal. Now you can access parts of your medical record, email your doctor's office, and request medication refills online. 1. In your internet browser, go to [email or web address] 2. Click on the First Time User? Click Here link in the Sign In box. You will see the New Member Sign Up page. 3. Enter your [employer] Access Code exactly as it appears below. You will not need to use this code after youve completed the sign-up process. If you do not sign up before the expiration date, you must request a new code. · [employer] Access Code: [email or web address] Expires: 1/21/2018  4:34 PM 
 
4. Enter the last four digits of your Social Security Number (xxxx) and Date of Birth (mm/dd/yyyy) as indicated and click Submit. You will be taken to the next sign-up page. 5. Create a [employer] ID. This will be your [employer] login ID and cannot be changed, so think of one that is secure and easy to remember. 6. Create a [employer] password. You can change your password at any time. 7. Enter your Password Reset Question and Answer. This can be used at a later time if you forget your password. 8. Enter your e-mail address. You will receive e-mail notification when new information is available in [address] 9. Click Sign Up. You can now view and download portions of your medical record. 10. Click the Download Summary menu link to download a portable copy of your medical information. If you have questions, please visit the Frequently Asked Questions section of the [employer] website. Remember, [employer] is NOT to be used for urgent needs. For medical emergencies, dial 911. Now available from your iPhone and Android! Please provide this summary of care documentation to your next provider. Your primary care clinician is listed as Naman Snow. If you have any questions after today's visit, please call [phone].

## 2017-11-30 ENCOUNTER — OFFICE VISIT (OUTPATIENT)
Dept: ENDOCRINOLOGY | Age: 77
End: 2017-11-30

## 2017-11-30 VITALS
OXYGEN SATURATION: 97 % | RESPIRATION RATE: 16 BRPM | SYSTOLIC BLOOD PRESSURE: 137 MMHG | TEMPERATURE: 96.5 F | HEART RATE: 68 BPM | WEIGHT: 256.8 LBS | HEIGHT: 62 IN | DIASTOLIC BLOOD PRESSURE: 62 MMHG | BODY MASS INDEX: 47.26 KG/M2

## 2017-11-30 DIAGNOSIS — E11.65 TYPE 2 DIABETES MELLITUS WITH HYPERGLYCEMIA, WITH LONG-TERM CURRENT USE OF INSULIN (HCC): ICD-10-CM

## 2017-11-30 DIAGNOSIS — Z79.4 TYPE 2 DIABETES MELLITUS WITH HYPERGLYCEMIA, WITH LONG-TERM CURRENT USE OF INSULIN (HCC): Primary | ICD-10-CM

## 2017-11-30 DIAGNOSIS — E11.65 TYPE 2 DIABETES MELLITUS WITH HYPERGLYCEMIA, WITH LONG-TERM CURRENT USE OF INSULIN (HCC): Primary | ICD-10-CM

## 2017-11-30 DIAGNOSIS — E78.2 MIXED HYPERLIPIDEMIA: ICD-10-CM

## 2017-11-30 DIAGNOSIS — I10 ESSENTIAL HYPERTENSION: ICD-10-CM

## 2017-11-30 DIAGNOSIS — Z79.4 TYPE 2 DIABETES MELLITUS WITH HYPERGLYCEMIA, WITH LONG-TERM CURRENT USE OF INSULIN (HCC): ICD-10-CM

## 2017-11-30 PROBLEM — E66.01 OBESITY, MORBID (HCC): Status: ACTIVE | Noted: 2017-11-30

## 2017-11-30 NOTE — PROGRESS NOTES
Mary Sinclair is a 78 y.o. female here for   Chief Complaint   Patient presents with    Diabetes     B mg/dl  A1C: 9.5%    Functional glucose monitor and record keeping system? - yes  Eye exam within last year? - 5-6 yrs ago  Foot exam within last year? - Oct 2017    1. Have you been to the ER, urgent care clinic since your last visit? Hospitalized since your last visit? -no    2. Have you seen or consulted any other health care providers outside of the [address] [place] [address] since your last visit?   Include any pap smears or colon screening.-no      Lab Results   Component Value Date/Time    Hemoglobin A1c 10.4 01/10/2017 09:29 AM    Hemoglobin A1c (POC) 9.3 10/23/2017 04:05 PM    Hemoglobin A1c, External 9.3 2015       Wt Readings from Last 3 Encounters:   10/23/17 262 lb (118.8 kg)   17 262 lb (118.8 kg)   10/14/16 250 lb 6.4 oz (113.6 kg)     Temp Readings from Last 3 Encounters:   10/23/17 96.4 °F (35.8 °C) (Oral)   17 98.2 °F (36.8 °C) (Oral)   10/14/16 95.7 °F (35.4 °C) (Oral)     BP Readings from Last 3 Encounters:   10/23/17 165/62   17 171/76   10/14/16 147/77     Pulse Readings from Last 3 Encounters:   10/23/17 62   17 76   10/14/16 72

## 2017-11-30 NOTE — MR AVS SNAPSHOT
Visit Information Date & Time Provider Department Dept. Phone Encounter #  
 11/30/2017 11:45 AM Suzanne Hutchison MD [facility] [phone] [id number] Follow-up Instructions Return in about 3 months (around 2/28/2018). Your Appointments 11/30/2017 11:45 AM  
ROUTINE CARE with Suzanne Hutchison MD  
[facility] [facility]) Appt Note: dm 1 month f/u per dr. Fountain Chimera; rs from 11/29  
 [address] [place] [address] [address] [facility] [address]  
[phone]  
  
   
 [address] [place] [id number]  
  
    
 2/13/2018  2:45 PM  
ROUTINE CARE with Suzanne Hutchison MD  
[facility] [address] [place] [address]) Appt Note: 4 mon fu  
 [address] [facility] [address]  
[phone] Upcoming Health Maintenance Date Due  
 FOOT EXAM Q1 8/22/1950 EYE EXAM RETINAL OR DILATED Q1 8/22/1952 DTaP/Tdap/Td series (1 - Tdap) 8/22/1963 ZOSTER VACCINE AGE 60> 6/22/2002 GLAUCOMA SCREENING Q2Y 8/22/2007 OSTEOPOROSIS SCREENING (DEXA) 8/22/2007 Pneumococcal 65+ Low/Medium Risk (1 of 2 - PCV13) 8/22/2007 MEDICARE YEARLY EXAM 8/22/2007 Influenza Age 5 to Adult 8/1/2017 MICROALBUMIN Q1 1/10/2018 LIPID PANEL Q1 1/10/2018 HEMOGLOBIN A1C Q6M 4/23/2018 Allergies as of 11/30/2017  Review Complete On: 11/30/2017 By: Leonarda Frost LPN Severity Noted Reaction Type Reactions Lortab [Hydrocodone-acetaminophen] High 09/17/2014    Other (comments)  
 hallucinations Phenergan [Promethazine] High 01/27/2015    Other (comments) Hallucination Cephalexin  05/16/2016    Vertigo Oxycodone  09/17/2014    Other (comments)  
 hallucination Current Immunizations  Never Reviewed No immunizations on file. Not reviewed this visit You Were Diagnosed With   
  
 Codes Comments  Type 2 diabetes mellitus with hyperglycemia, with long-term current use of insulin ([facility]    -  Primary ICD-10-CM: E11.65, Z79.4 ICD-9-CM: 250.00, 790.29, V58.67 Mixed hyperlipidemia     ICD-10-CM: E78.2 ICD-9-CM: 272.2 Essential hypertension     ICD-10-CM: I10 
ICD-9-CM: 401.9 Vitals BP Pulse Temp Resp Height(growth percentile) Weight(growth percentile)  
 137/62 (BP 1 Location: Left arm, BP Patient Position: Sitting) 68 96.5 °F (35.8 °C) (Oral) 16 5' 2\" (1.575 m) 256 lb 12.8 oz (116.5 kg) SpO2 BMI OB Status Smoking Status 97% 46.97 kg/m2 Menopause Never Smoker Vitals History BMI and BSA Data Body Mass Index Body Surface Area  
 46.97 kg/m 2 2.26 m 2 Preferred Pharmacy Pharmacy Name [facility] [address] Your Updated Medication List  
  
   
This list is accurate as of: 11/30/17 11:33 AM.  Always use your most recent med list.  
  
  
  
  
 ASPERCREME 10 % lotion Generic drug:  trolamine salicylate Apply  to affected area. aspirin delayed-release 81 mg tablet Take 81 mg by mouth daily. * Blood-Glucose Meter monitoring kit Test blood glucose 3 times daily * Blood-Glucose Meter monitoring kit Commonly known as:  Alexandra Cloud Test blood glucose 3 times daily CENTRUM SILVER PO Take  by mouth. cephALEXin 500 mg capsule Commonly known as:  Gwendlyn Campi Take 500 mg by mouth BID  
  
 DIOVAN 80 mg tablet Generic drug:  valsartan Take 80 mg by mouth daily. dulaglutide 0.75 mg/0.5 mL sub-q pen Commonly known as:  TRULICITY  
0.5 mL by SubCUTAneous route every seven (7) days. furosemide 40 mg tablet Commonly known as:  LASIX Take 40 mg by mouth daily. glucose blood VI test strips strip Commonly known as:  ONETOUCH ULTRA TEST Test blood glucose 3 times daily Dx Code: E11.65  
  
 insulin detemir 100 unit/mL (3 mL) Inpn Commonly known as:  Valentino Scherer  
 INJECT 75 UNITS TWICE DAILY . Jose L Reasoner Jose L Tang THIS  IS  A  DOSE  CHANGE. .. Insulin Needles (Disposable) 31 gauge x 3/16\" Ndle Commonly known as:  BD INSULIN PEN NEEDLE UF MINI Use to inject Levemir twice daily. Dx code E11.65 Lancets Misc Test blood glucose 3 times daily  
  
 meclizine 25 mg tablet Commonly known as:  ANTIVERT Take  by mouth three (3) times daily as needed. metFORMIN 1,000 mg tablet Commonly known as:  GLUCOPHAGE Take 1 Tab by mouth two (2) times daily (with meals). METOLAZONE PO Take  by mouth.  
  
 metoprolol succinate 25 mg XL tablet Commonly known as:  TOPROL-XL Take 25 mg by mouth daily  
  
 naproxen 250 mg tablet Commonly known as:  NAPROSYN Take 250 mg by mouth two (2) times daily as needed for Pain.  
  
 pravastatin 40 mg tablet Commonly known as:  PRAVACHOL Take 40 mg by mouth nightly. traMADol 50 mg tablet Commonly known as:  ULTRAM  
Take 50 mg by mouth every six (6) hours as needed for Pain. * Notice: This list has 2 medication(s) that are the same as other medications prescribed for you. Read the directions carefully, and ask your doctor or other care provider to review them with you. Follow-up Instructions Return in about 3 months (around 2/28/2018). Patient Instructions Levemir 55 units three times a day Introducing [facility] SERVICES! Ann-Marie Luevano introduces [employer] patient portal. Now you can access parts of your medical record, email your doctor's office, and request medication refills online. 1. In your internet browser, go to [email or web address] 2. Click on the First Time User? Click Here link in the Sign In box. You will see the New Member Sign Up page. 3. Enter your [employer] Access Code exactly as it appears below. You will not need to use this code after youve completed the sign-up process.  If you do not sign up before the expiration date, you must request a new code. 
 
· [employer] Access Code: [email or web address] Expires: 1/21/2018  3:34 PM 
 
4. Enter the last four digits of your Social Security Number (xxxx) and Date of Birth (mm/dd/yyyy) as indicated and click Submit. You will be taken to the next sign-up page. 5. Create a [employer] ID. This will be your [employer] login ID and cannot be changed, so think of one that is secure and easy to remember. 6. Create a [employer] password. You can change your password at any time. 7. Enter your Password Reset Question and Answer. This can be used at a later time if you forget your password. 8. Enter your e-mail address. You will receive e-mail notification when new information is available in [address] 9. Click Sign Up. You can now view and download portions of your medical record. 10. Click the Download Summary menu link to download a portable copy of your medical information. If you have questions, please visit the Frequently Asked Questions section of the [employer] website. Remember, [employer] is NOT to be used for urgent needs. For medical emergencies, dial 911. Now available from your iPhone and Android! Please provide this summary of care documentation to your next provider. Your primary care clinician is listed as Maddie Cueva. If you have any questions after today's visit, please call [phone].

## 2017-11-30 NOTE — PROGRESS NOTES
King Sanchez MD          Patient Information  Date:11/30/2017  Name : Eliot Douglas 78 y.o.     YOB: 1940         Referred by: Eleno Moura NP         Chief Complaint   Patient presents with    Diabetes       History of Present Illness: Eliot Douglas is a 78 y.o. female here for follow up of  Type 2 Diabetes Mellitus. She has nonspecific side effects to multiple medications, Humalog was discontinued as she c/o cramping in the legs and Trulicity was started. She is taking Trulicity now consistently,  Still forgets insulin   Has checked BG 5 times in the last one month which is very little data for me to go by     Waiting for better glycemic control so she can proceed with foot surgery   She has preulcerative callus and needs surgery, waiting for clearance from the cardiologist. She has infection and intermittently on antibiotics. Blood glucose is fluctuating a lot and sometimes infection can cause severe hyperglycemia. In the past, she had mentioned being unable to afford a healthy diet and then on a high carb diet. She has history of lymphedema and follows up with lymphedema clinic. No regular exercise.              Wt Readings from Last 3 Encounters:   11/30/17 256 lb 12.8 oz (116.5 kg)   10/23/17 262 lb (118.8 kg)   01/17/17 262 lb (118.8 kg)       BP Readings from Last 3 Encounters:   11/30/17 137/62   10/23/17 165/62   01/17/17 171/76           Past Medical History:   Diagnosis Date    HTN (hypertension)     Hyperlipidemia     Hypothyroid     Lymphedema     Type II or unspecified type diabetes mellitus with neurological manifestations, uncontrolled(250.62) (HCC)      Current Outpatient Prescriptions   Medication Sig    cephALEXin (KEFLEX) 500 mg capsule Take 500 mg by mouth BID    metoprolol succinate (TOPROL-XL) 25 mg XL tablet Take 25 mg by mouth daily    insulin detemir (LEVEMIR FLEXTOUCH) 100 unit/mL (3 mL) inpn INJECT 75 UNITS TWICE DAILY . Misa Lawson THIS  IS  A  DOSE  CHANGE. .. (Patient taking differently: INJECT 70 UNITS TWICE DAILY . Misa Lawson THIS  IS  A  DOSE  CHANGE. Misa Lawson )    Insulin Needles, Disposable, (BD INSULIN PEN NEEDLE UF MINI) 31 gauge x 3/16\" ndle Use to inject Levemir twice daily. Dx code E11.65    metFORMIN (GLUCOPHAGE) 1,000 mg tablet Take 1 Tab by mouth two (2) times daily (with meals).  dulaglutide (TRULICITY) 2.05 GURMEET/5.3 mL sub-q pen 0.5 mL by SubCUTAneous route every seven (7) days.  insulin detemir (LEVEMIR FLEXTOUCH) 100 unit/mL (3 mL) inpn Inject 60 units BID (Patient taking differently: 70 Units by SubCUTAneous route two (2) times a day. Inject 60 units BID)    traMADol (ULTRAM) 50 mg tablet Take 50 mg by mouth every six (6) hours as needed for Pain.  meclizine (ANTIVERT) 25 mg tablet Take  by mouth three (3) times daily as needed.  trolamine salicylate (ASPERCREME) 10 % lotion Apply  to affected area.  glucose blood VI test strips (ONETOUCH ULTRA TEST) strip Test blood glucose 3 times daily Dx Code: E11.65    naproxen (NAPROSYN) 250 mg tablet Take 250 mg by mouth two (2) times daily as needed for Pain.  Blood-Glucose Meter (ONE TOUCH ULTRAMINI) monitoring kit Test blood glucose 3 times daily    Blood-Glucose Meter monitoring kit Test blood glucose 3 times daily    Lancets misc Test blood glucose 3 times daily    furosemide (LASIX) 40 mg tablet Take 40 mg by mouth daily.  aspirin delayed-release 81 mg tablet Take 81 mg by mouth daily.  pravastatin (PRAVACHOL) 40 mg tablet Take 40 mg by mouth nightly.  METOLAZONE PO Take  by mouth.  FOLIC ACID/MULTIVIT-MIN/LUTEIN (CENTRUM SILVER PO) Take  by mouth.  valsartan (DIOVAN) 80 mg tablet Take 80 mg by mouth daily. No current facility-administered medications for this visit.       Allergies   Allergen Reactions    Lortab [Hydrocodone-Acetaminophen] Other (comments)     hallucinations    Phenergan [Promethazine] Other (comments)     Hallucination    Cephalexin Vertigo    Oxycodone Other (comments)     hallucination         Review of Systems:  - Constitutional Symptoms: no fevers, no chills  - Eyes: no blurry vision no double vision  - Cardiovascular: no chest pain ,no palpitations  - Respiratory: no cough no shortness of breath  - Gastrointestinal: no dysphagia no  abdominal pain  - Musculoskeletal: no joint pains +  weakness  - Integumentary: no rashes  - Neurological: + numbness, tingling, no  headaches  -     Physical Examination:   Blood pressure 137/62, pulse 68, temperature 96.5 °F (35.8 °C), temperature source Oral, resp. rate 16, height 5' 2\" (1.575 m), weight 256 lb 12.8 oz (116.5 kg), SpO2 97 %. Estimated body mass index is 46.97 kg/(m^2) as calculated from the following:    Height as of this encounter: 5' 2\" (1.575 m). -   Weight as of this encounter: 256 lb 12.8 oz (116.5 kg). - General: pleasant, no distress, good eye contact,obese  - HEENT: no pallor, no periorbital edema, EOMI  - Neck: supple, no thyromegaly  - Cardiovascular: regular, normal rate, normal S1 and S2  - Respiratory: clear to auscultation bilaterally  - Gastrointestinal: soft, nontender, nondistended,  BS +  - Musculoskeletal:  no acanthosis nigricans,+ edema,   - Foot exam -:lymphedema , right callus ,  - Neurological: alert and oriented  - Psychiatric: normal mood and affect  - Skin: color, texture, turgor normal.       Data Reviewed:     [] Glucose records reviewed. [] See glucose records for details (to be scanned).   [] A1C  [x] Reviewed labs    Creatinine normal, A1 C  Lab Results   Component Value Date/Time    Hemoglobin A1c 10.4 01/10/2017 09:29 AM    Hemoglobin A1c 10.9 01/20/2015 09:52 AM    Glucose 269 01/10/2017 09:29 AM    Glucose (POC) 158 03/18/2015 12:08 PM    Glucose  10/23/2017 04:05 PM    Microalb/Creat ratio (ug/mg creat.) 34.2 01/10/2017 09:29 AM    LDL, calculated 139 01/10/2017 09:29 AM    Creatinine 1.03 01/10/2017 09:29 AM    Hemoglobin A1c, External 9.3 12/02/2015    Hemoglobin A1c, External 10.6 05/07/2015 12:39 PM      Lab Results   Component Value Date/Time    Cholesterol, total 204 01/10/2017 09:29 AM    HDL Cholesterol 38 01/10/2017 09:29 AM    LDL, calculated 139 01/10/2017 09:29 AM    Triglyceride 133 01/10/2017 09:29 AM     Lab Results   Component Value Date/Time    ALT (SGPT) 15 01/10/2017 09:29 AM    AST (SGOT) 22 01/10/2017 09:29 AM    Alk. phosphatase 98 01/10/2017 09:29 AM    Bilirubin, total 0.4 01/10/2017 09:29 AM     Lab Results   Component Value Date/Time    GFR est AA 62 01/10/2017 09:29 AM    GFR est non-AA 54 01/10/2017 09:29 AM    Creatinine 1.03 01/10/2017 09:29 AM    BUN 16 01/10/2017 09:29 AM    Sodium 139 01/10/2017 09:29 AM    Potassium 5.7 01/10/2017 09:29 AM    Chloride 97 01/10/2017 09:29 AM    CO2 27 01/10/2017 09:29 AM      No results found for: TSH, TSHEXT     Assessment/Plan:     1. Type 2 diabetes mellitus with hyperglycemia, with long-term current use of insulin ([address]    2. Mixed hyperlipidemia    3. Essential hypertension        1. Type 2 Diabetes Mellitus with nephropathy,uncontrolled  Lab Results   Component Value Date/Time    Hemoglobin A1c 10.4 01/10/2017 09:29 AM    Hemoglobin A1c (POC) 9.3 10/23/2017 04:05 PM    Hemoglobin A1c, External 9.3 12/02/2015       Noncompliance with checking glucose      She had nonspecific side effects to Humalog and did not want to be changed to Novolog. Discussed she needs an effort for better control. Metformin 1000 mg twice a day   Levemir 75 units BID   Stop Humalog  30 units TID        Advised to check glucose 4 times daily    2. HTN : Continue current therapy         3. Hyperlipidemia : Continue statin. 4.Obesity:Body mass index is 46.97 kg/(m^2). GLP1    5 PAD - PCI ,Lymphedema          There are no Patient Instructions on file for this visit.   Follow-up Disposition: Not on File    Thank you for allowing me to participate in the care of this patient.     Elsie Lui MD

## 2017-12-12 DIAGNOSIS — E11.65 UNCONTROLLED TYPE 2 DIABETES MELLITUS WITH HYPERGLYCEMIA, WITH LONG-TERM CURRENT USE OF INSULIN (HCC): ICD-10-CM

## 2017-12-12 DIAGNOSIS — Z79.4 UNCONTROLLED TYPE 2 DIABETES MELLITUS WITH HYPERGLYCEMIA, WITH LONG-TERM CURRENT USE OF INSULIN (HCC): ICD-10-CM

## 2017-12-12 RX ORDER — DULAGLUTIDE 0.75 MG/.5ML
INJECTION, SOLUTION SUBCUTANEOUS
Qty: 12 PEN | Refills: 5 | Status: SHIPPED | OUTPATIENT
Start: 2017-12-12 | End: 2018-03-30

## 2017-12-13 DIAGNOSIS — E11.65 TYPE 2 DIABETES MELLITUS WITH HYPERGLYCEMIA, WITH LONG-TERM CURRENT USE OF INSULIN (HCC): ICD-10-CM

## 2017-12-13 DIAGNOSIS — Z79.4 TYPE 2 DIABETES MELLITUS WITH HYPERGLYCEMIA, WITH LONG-TERM CURRENT USE OF INSULIN (HCC): ICD-10-CM

## 2017-12-13 RX ORDER — METFORMIN HYDROCHLORIDE 1000 MG/1
1000 TABLET ORAL 2 TIMES DAILY WITH MEALS
Qty: 180 TAB | Refills: 3 | Status: SHIPPED | OUTPATIENT
Start: 2017-12-13 | End: 2019-01-01

## 2017-12-13 NOTE — TELEPHONE ENCOUNTER
Patient needs refill on Metformin sent to [facility]

## 2018-01-01 ENCOUNTER — OFFICE VISIT (OUTPATIENT)
Dept: ENDOCRINOLOGY | Age: 78
End: 2018-01-01

## 2018-01-01 ENCOUNTER — TELEPHONE (OUTPATIENT)
Dept: ENDOCRINOLOGY | Age: 78
End: 2018-01-01

## 2018-01-01 VITALS
SYSTOLIC BLOOD PRESSURE: 123 MMHG | WEIGHT: 249.9 LBS | HEART RATE: 77 BPM | BODY MASS INDEX: 45.99 KG/M2 | RESPIRATION RATE: 16 BRPM | TEMPERATURE: 96.2 F | HEIGHT: 62 IN | DIASTOLIC BLOOD PRESSURE: 50 MMHG

## 2018-01-01 VITALS
SYSTOLIC BLOOD PRESSURE: 134 MMHG | HEART RATE: 82 BPM | DIASTOLIC BLOOD PRESSURE: 60 MMHG | RESPIRATION RATE: 14 BRPM | TEMPERATURE: 95.9 F | OXYGEN SATURATION: 95 % | HEIGHT: 62 IN

## 2018-01-01 DIAGNOSIS — E78.2 MIXED HYPERLIPIDEMIA: ICD-10-CM

## 2018-01-01 DIAGNOSIS — Z79.4 TYPE 2 DIABETES MELLITUS WITH DIABETIC POLYNEUROPATHY, WITH LONG-TERM CURRENT USE OF INSULIN (HCC): ICD-10-CM

## 2018-01-01 DIAGNOSIS — I73.9 PAD (PERIPHERAL ARTERY DISEASE) (HCC): ICD-10-CM

## 2018-01-01 DIAGNOSIS — E03.4 HYPOTHYROIDISM DUE TO ACQUIRED ATROPHY OF THYROID: ICD-10-CM

## 2018-01-01 DIAGNOSIS — E11.65 TYPE 2 DIABETES MELLITUS WITH HYPERGLYCEMIA, WITH LONG-TERM CURRENT USE OF INSULIN (HCC): ICD-10-CM

## 2018-01-01 DIAGNOSIS — Z79.4 UNCONTROLLED TYPE 2 DIABETES MELLITUS WITH HYPERGLYCEMIA, WITH LONG-TERM CURRENT USE OF INSULIN (HCC): ICD-10-CM

## 2018-01-01 DIAGNOSIS — L03.115 CELLULITIS OF RIGHT LOWER EXTREMITY: ICD-10-CM

## 2018-01-01 DIAGNOSIS — Z79.4 TYPE 2 DIABETES MELLITUS WITH HYPERGLYCEMIA, WITH LONG-TERM CURRENT USE OF INSULIN (HCC): Primary | ICD-10-CM

## 2018-01-01 DIAGNOSIS — E11.65 TYPE 2 DIABETES MELLITUS WITH HYPERGLYCEMIA, WITH LONG-TERM CURRENT USE OF INSULIN (HCC): Primary | ICD-10-CM

## 2018-01-01 DIAGNOSIS — I10 ESSENTIAL HYPERTENSION: ICD-10-CM

## 2018-01-01 DIAGNOSIS — E11.65 UNCONTROLLED TYPE 2 DIABETES MELLITUS WITH HYPERGLYCEMIA, WITH LONG-TERM CURRENT USE OF INSULIN (HCC): Primary | ICD-10-CM

## 2018-01-01 DIAGNOSIS — Z79.4 TYPE 2 DIABETES MELLITUS WITH HYPERGLYCEMIA, WITH LONG-TERM CURRENT USE OF INSULIN (HCC): ICD-10-CM

## 2018-01-01 DIAGNOSIS — E11.42 TYPE 2 DIABETES MELLITUS WITH DIABETIC POLYNEUROPATHY, WITH LONG-TERM CURRENT USE OF INSULIN (HCC): ICD-10-CM

## 2018-01-01 DIAGNOSIS — E11.65 UNCONTROLLED TYPE 2 DIABETES MELLITUS WITH HYPERGLYCEMIA, WITH LONG-TERM CURRENT USE OF INSULIN (HCC): ICD-10-CM

## 2018-01-01 DIAGNOSIS — Z79.4 UNCONTROLLED TYPE 2 DIABETES MELLITUS WITH HYPERGLYCEMIA, WITH LONG-TERM CURRENT USE OF INSULIN (HCC): Primary | ICD-10-CM

## 2018-01-01 LAB
A1C %: 7.7
CREATININE, EXTERNAL: 1.08
HBA1C MFR BLD HPLC: 7.2 %

## 2018-01-01 RX ORDER — SULFAMETHOXAZOLE AND TRIMETHOPRIM 800; 160 MG/1; MG/1
1 TABLET ORAL 2 TIMES DAILY
COMMUNITY
End: 2018-01-01 | Stop reason: ALTCHOICE

## 2018-01-01 RX ORDER — INSULIN ASPART 100 [IU]/ML
INJECTION, SOLUTION INTRAVENOUS; SUBCUTANEOUS
Qty: 60 ML | Refills: 3 | Status: SHIPPED | OUTPATIENT
Start: 2018-01-01 | End: 2018-01-01

## 2018-01-01 RX ORDER — INSULIN LISPRO 100 [IU]/ML
INJECTION, SOLUTION INTRAVENOUS; SUBCUTANEOUS
Qty: 60 ML | Refills: 3 | Status: SHIPPED | OUTPATIENT
Start: 2018-01-01

## 2018-01-01 RX ORDER — PEN NEEDLE, DIABETIC 31 GX3/16"
NEEDLE, DISPOSABLE MISCELLANEOUS
Qty: 400 PEN NEEDLE | Refills: 3 | Status: SHIPPED | OUTPATIENT
Start: 2018-01-01

## 2018-02-13 ENCOUNTER — OFFICE VISIT (OUTPATIENT)
Dept: ENDOCRINOLOGY | Age: 78
End: 2018-02-13

## 2018-02-13 VITALS
RESPIRATION RATE: 18 BRPM | DIASTOLIC BLOOD PRESSURE: 62 MMHG | BODY MASS INDEX: 47.52 KG/M2 | HEART RATE: 85 BPM | WEIGHT: 258.2 LBS | OXYGEN SATURATION: 96 % | TEMPERATURE: 95.9 F | HEIGHT: 62 IN | SYSTOLIC BLOOD PRESSURE: 153 MMHG

## 2018-02-13 DIAGNOSIS — I10 ESSENTIAL HYPERTENSION: ICD-10-CM

## 2018-02-13 DIAGNOSIS — L03.115 CELLULITIS OF RIGHT LOWER EXTREMITY: ICD-10-CM

## 2018-02-13 DIAGNOSIS — E78.2 MIXED HYPERLIPIDEMIA: ICD-10-CM

## 2018-02-13 DIAGNOSIS — Z79.4 TYPE 2 DIABETES MELLITUS WITH HYPERGLYCEMIA, WITH LONG-TERM CURRENT USE OF INSULIN (HCC): Primary | ICD-10-CM

## 2018-02-13 DIAGNOSIS — E11.65 TYPE 2 DIABETES MELLITUS WITH HYPERGLYCEMIA, WITH LONG-TERM CURRENT USE OF INSULIN (HCC): Primary | ICD-10-CM

## 2018-02-13 LAB
GLUCOSE POC: 367 MG/DL
HBA1C MFR BLD HPLC: 11.1 %

## 2018-02-13 RX ORDER — SULFAMETHOXAZOLE AND TRIMETHOPRIM 800; 160 MG/1; MG/1
1 TABLET ORAL 2 TIMES DAILY
Qty: 28 TAB | Refills: 0 | Status: SHIPPED | OUTPATIENT
Start: 2018-02-13 | End: 2018-02-26 | Stop reason: ALTCHOICE

## 2018-02-13 RX ORDER — INSULIN LISPRO 100 [IU]/ML
INJECTION, SOLUTION INTRAVENOUS; SUBCUTANEOUS
Qty: 45 ML | Refills: 11 | Status: SHIPPED | OUTPATIENT
Start: 2018-02-13 | End: 2018-02-14 | Stop reason: ALTCHOICE

## 2018-02-13 RX ORDER — AMMONIUM LACTATE 12 G/100G
CREAM TOPICAL AS NEEDED
COMMUNITY
End: 2019-01-01 | Stop reason: ALTCHOICE

## 2018-02-13 NOTE — PATIENT INSTRUCTIONS
Continue Metformin    Stop Trulicity     Levemir 60 units in AM and 60 units at bedtime    Check sugars 4 times a day     Humalog or Novolog fast acting 30units before breakfast and 30 units lunch and 30 dinner  each meal     Additional Humalog or Novolog for high sugars based on the scale    Blood sugar Fast acting insulin Breakfast/Lunch/Dinner     150-200  Add 4 Units       201-250  Add  8 Units       251-300  Add 12 Units       301-350  Add 16  Units        351-400  Add 12 Units           Bring medicine bottles to your visit

## 2018-02-13 NOTE — MR AVS SNAPSHOT
[facility] [address] [facility] [address] 
[phone] Patient: Jina Kelley MRN: [id number] PAE:[birth year] Visit Information Date & Time Provider Department Dept. Phone Encounter #  
 2/13/2018  2:45 PM Melba Pereira MD [facility] [phone] [id number] Follow-up Instructions Return in about 7 weeks (around 4/3/2018). Upcoming Health Maintenance Date Due  
 FOOT EXAM Q1 8/22/1950 EYE EXAM RETINAL OR DILATED Q1 8/22/1952 DTaP/Tdap/Td series (1 - Tdap) 8/22/1963 ZOSTER VACCINE AGE 60> 6/22/2002 GLAUCOMA SCREENING Q2Y 8/22/2007 OSTEOPOROSIS SCREENING (DEXA) 8/22/2007 Pneumococcal 65+ Low/Medium Risk (1 of 2 - PCV13) 8/22/2007 MEDICARE YEARLY EXAM 8/22/2007 Influenza Age 5 to Adult 8/1/2017 MICROALBUMIN Q1 1/10/2018 LIPID PANEL Q1 1/10/2018 HEMOGLOBIN A1C Q6M 4/23/2018 Allergies as of 2/13/2018  Review Complete On: 2/13/2018 By: Melba Pereira MD  
  
 Severity Noted Reaction Type Reactions Lortab [Hydrocodone-acetaminophen] High 09/17/2014    Other (comments)  
 hallucinations Phenergan [Promethazine] High 01/27/2015    Other (comments) Hallucination Cephalexin  05/16/2016    Vertigo Oxycodone  09/17/2014    Other (comments)  
 hallucination Current Immunizations  Never Reviewed No immunizations on file. Not reviewed this visit You Were Diagnosed With   
  
 Codes Comments Type 2 diabetes mellitus with hyperglycemia, with long-term current use of insulin (HCC)    -  Primary ICD-10-CM: E11.65, Z79.4 ICD-9-CM: 250.00, 790.29, V58.67 Mixed hyperlipidemia     ICD-10-CM: E78.2 ICD-9-CM: 272.2 Essential hypertension     ICD-10-CM: I10 
ICD-9-CM: 401.9 Cellulitis of right lower extremity     ICD-10-CM: L03.115 ICD-9-CM: 906. 6 Vitals BP Pulse Temp Resp Height(growth percentile) Weight(growth percentile) 153/62 (BP 1 Location: Right arm, BP Patient Position: Sitting) 85 95.9 °F (35.5 °C) (Oral) 18 5' 2\" (1.575 m) 258 lb 3.2 oz (117.1 kg) SpO2 BMI OB Status Smoking Status 96% 47.23 kg/m2 Menopause Never Smoker Vitals History BMI and BSA Data Body Mass Index Body Surface Area  
 47.23 kg/m 2 2.26 m 2 Preferred Pharmacy Pharmacy Name Phone [facility] [address] [phone] Your Updated Medication List  
  
   
This list is accurate as of: 2/13/18  3:36 PM.  Always use your most recent med list.  
  
  
  
  
 ammonium lactate 12 % topical cream  
Commonly known as:  AMLACTIN Apply  to affected area two (2) times a day. rub in to affected area well ASPERCREME 10 % lotion Generic drug:  trolamine salicylate Apply  to affected area. aspirin delayed-release 81 mg tablet Take 81 mg by mouth daily. * Blood-Glucose Meter monitoring kit Test blood glucose 3 times daily * Blood-Glucose Meter monitoring kit Commonly known as:  Pink Silvius Test blood glucose 3 times daily CENTRUM SILVER PO Take  by mouth. cephALEXin 500 mg capsule Commonly known as:  Lethaniel Kin Take 500 mg by mouth BID  
  
 DIOVAN 80 mg tablet Generic drug:  valsartan Take 80 mg by mouth daily. furosemide 40 mg tablet Commonly known as:  LASIX Take 40 mg by mouth daily. glucose blood VI test strips strip Commonly known as:  ONETOUCH ULTRA TEST Test blood glucose 3 times daily Dx Code: E11.65  
  
 insulin detemir 100 unit/mL (3 mL) Inpn Commonly known as:  Blanche Armenta INJECT 55 UNITS TID . Jocelyne Sherwood Jocelyne Sherwood THIS  IS  A  DOSE  CHANGE. .. Insulin Needles (Disposable) 31 gauge x 3/16\" Ndle Commonly known as:  BD INSULIN PEN NEEDLE UF MINI Use to inject Levemir twice daily. Dx code E11.65 Lancets Misc Test blood glucose 3 times daily  
  
 meclizine 25 mg tablet Commonly known as:  ANTIVERT Take  by mouth three (3) times daily as needed. metFORMIN 1,000 mg tablet Commonly known as:  GLUCOPHAGE Take 1 Tab by mouth two (2) times daily (with meals). METOLAZONE PO Take  by mouth.  
  
 metoprolol succinate 25 mg XL tablet Commonly known as:  TOPROL-XL Take 25 mg by mouth daily  
  
 naproxen 250 mg tablet Commonly known as:  NAPROSYN Take 250 mg by mouth two (2) times daily as needed for Pain.  
  
 pravastatin 40 mg tablet Commonly known as:  PRAVACHOL Take 40 mg by mouth nightly. traMADol 50 mg tablet Commonly known as:  ULTRAM  
Take 50 mg by mouth every six (6) hours as needed for Pain. trimethoprim-sulfamethoxazole 160-800 mg per tablet Commonly known as:  BACTRIM DS, SEPTRA DS Take 1 Tab by mouth two (2) times a day. Stop Keflex TRULICITY 9.17 BS/2.0 mL sub-q pen Generic drug:  dulaglutide INJECT O.5 ML SUBCUTANEOUSLY EVERY 7 DAYS * Notice: This list has 2 medication(s) that are the same as other medications prescribed for you. Read the directions carefully, and ask your doctor or other care provider to review them with you. Prescriptions Sent to Pharmacy Refills  
 trimethoprim-sulfamethoxazole (BACTRIM DS, SEPTRA DS) 160-800 mg per tablet 0 Sig: Take 1 Tab by mouth two (2) times a day. Stop Keflex Class: Normal  
 Pharmacy: [facility] DR SHAMIR WILLIAM NUANDREZ [address] Ph #: [phone] Route: Oral  
  
We Performed the Following AMB POC GLUCOSE, QUANTITATIVE, BLOOD [96686 CPT(R)] AMB POC HEMOGLOBIN A1C [17930 CPT(R)] Follow-up Instructions Return in about 7 weeks (around 4/3/2018). Patient Instructions Continue Metformin Stop Trulicity Levemir 60 units in AM and 60 units at bedtime Check sugars 4 times a day Humalog or Novolog fast acting 30units before breakfast and 30 units lunch and 30 dinner  each meal  
 
 Additional Humalog or Novolog for high sugars based on the scale Blood sugar Fast acting insulin Breakfast/Lunch/Dinner 150-200  Add 4 Units 201-250  Add  8 Units 251-300  Add 12 Units 301-350  Add 16  Units 351-400  Add 12 Units Bring medicine bottles to your visit Introducing \[facility] [place] [facility]\"" & HEALTH SERVICES! [facility] introduces Diavibe patient portal. Now you can access parts of your medical record, email your doctor's office, and request medication refills online. 1. In your internet browser, go to [email or web address] 2. Click on the First Time User? Click Here link in the Sign In box. You will see the New Member Sign Up page. 3. Enter your Diavibe Access Code exactly as it appears below. You will not need to use this code after youve completed the sign-up process. If you do not sign up before the expiration date, you must request a new code. · Diavibe Access Code: [email or web address] Expires: 5/14/2018  3:36 PM 
 
4. Enter the last four digits of your Social Security Number (xxxx) and Date of Birth (mm/dd/yyyy) as indicated and click Submit. You will be taken to the next sign-up page. 5. Create a Diavibe ID. This will be your Diavibe login ID and cannot be changed, so think of one that is secure and easy to remember. 6. Create a Diavibe password. You can change your password at any time. 7. Enter your Password Reset Question and Answer. This can be used at a later time if you forget your password. 8. Enter your e-mail address. You will receive e-mail notification when new information is available in [address] 9. Click Sign Up. You can now view and download portions of your medical record. 10. Click the Download Summary menu link to download a portable copy of your medical information. If you have questions, please visit the Frequently Asked Questions section of the Diavibe website.  Remember, Diavibe is NOT to be used for urgent needs. For medical emergencies, dial 911. Now available from your iPhone and Android! Please provide this summary of care documentation to your next provider. Your primary care clinician is listed as Candi Botello. If you have any questions after today's visit, please call [phone].

## 2018-02-13 NOTE — PROGRESS NOTES
Angela Rojas MD          Patient Information  Date:2/13/2018  Name : Frank Stallworth 78 y.o.     YOB: 1940         Referred by: Tita Cameron NP         Chief Complaint   Patient presents with    Diabetes       History of Present Illness: Frank Stallworth is a 78 y.o. female here for follow up of  Type 2 Diabetes Mellitus. type 2 diabetes, long-standing history of uncontrolled diabetes, complicated by peripheral neuropathy, foot ulcer    She had nonspecific side effects to multiple medications, Humalog was discontinued in the past as she c/o cramping in the legs and Trulicity was started. She is not taking Trulicity now as she ran out of the prescription. She also self stopped Levemir as it did not work. She is taking Humalog 20 units now before each meal, she has Humalog pens with her which was initially prescribed in September 2016. In the past she refused to take Humalog or even NovoLog  She is forgetting insulin. She has checked her blood glucose and they are ranging from 300-400  Has foot pain, complains of redness, swelling    Prior history  Self changes insulin, follow-up with me is very poor, she was seen in January 2017 and November 2017  The regimen changes every time she follows up with me    She has preulcerative callus      In the past, she had mentioned being unable to afford a healthy diet and hence on a high carb diet. She has history of lymphedema and was followed by lymphedema clinic in the past    No regular exercise.              Wt Readings from Last 3 Encounters:   02/13/18 258 lb 3.2 oz (117.1 kg)   11/30/17 256 lb 12.8 oz (116.5 kg)   10/23/17 262 lb (118.8 kg)       BP Readings from Last 3 Encounters:   02/13/18 153/62   11/30/17 137/62   10/23/17 165/62           Past Medical History:   Diagnosis Date    HTN (hypertension)     Hyperlipidemia     Hypothyroid     Lymphedema     Type II or unspecified type diabetes mellitus with neurological manifestations, uncontrolled(250.62) ([facility])      Current Outpatient Prescriptions   Medication Sig    ammonium lactate (AMLACTIN) 12 % topical cream Apply  to affected area two (2) times a day. rub in to affected area well    trimethoprim-sulfamethoxazole (BACTRIM DS, SEPTRA DS) 160-800 mg per tablet Take 1 Tab by mouth two (2) times a day. Stop Keflex    insulin detemir (LEVEMIR FLEXTOUCH) 100 unit/mL (3 mL) inpn INJECT 60 units BID  . Luis Hope THIS  IS  A  DOSE  CHANGE. ..  insulin lispro (HUMALOG) 100 unit/mL kwikpen 30 units before each meal with SSI ,MAx daily units 120    metFORMIN (GLUCOPHAGE) 1,000 mg tablet Take 1 Tab by mouth two (2) times daily (with meals).  cephALEXin (KEFLEX) 500 mg capsule Take 500 mg by mouth BID    metoprolol succinate (TOPROL-XL) 25 mg XL tablet Take 25 mg by mouth daily    Insulin Needles, Disposable, (BD INSULIN PEN NEEDLE UF MINI) 31 gauge x 3/16\" ndle Use to inject Levemir twice daily. Dx code E11.65    traMADol (ULTRAM) 50 mg tablet Take 50 mg by mouth every six (6) hours as needed for Pain.  meclizine (ANTIVERT) 25 mg tablet Take  by mouth three (3) times daily as needed.  glucose blood VI test strips (ONETOUCH ULTRA TEST) strip Test blood glucose 3 times daily Dx Code: E11.65    Blood-Glucose Meter (ONE TOUCH ULTRAMINI) monitoring kit Test blood glucose 3 times daily    Blood-Glucose Meter monitoring kit Test blood glucose 3 times daily    Lancets misc Test blood glucose 3 times daily    furosemide (LASIX) 40 mg tablet Take 40 mg by mouth daily.  aspirin delayed-release 81 mg tablet Take 81 mg by mouth daily.  pravastatin (PRAVACHOL) 40 mg tablet Take 40 mg by mouth nightly.  TRULICITY 6.42 YY/1.2 mL sub-q pen INJECT O.5 ML SUBCUTANEOUSLY EVERY 7 DAYS    METOLAZONE PO Take  by mouth.  trolamine salicylate (ASPERCREME) 10 % lotion Apply  to affected area.     FOLIC ACID/MULTIVIT-MIN/LUTEIN (CENTRUM SILVER PO) Take  by mouth.  naproxen (NAPROSYN) 250 mg tablet Take 250 mg by mouth two (2) times daily as needed for Pain.  valsartan (DIOVAN) 80 mg tablet Take 80 mg by mouth daily. No current facility-administered medications for this visit. Allergies   Allergen Reactions    Lortab [Hydrocodone-Acetaminophen] Other (comments)     hallucinations    Phenergan [Promethazine] Other (comments)     Hallucination    Cephalexin Vertigo    Oxycodone Other (comments)     hallucination         Review of Systems:  - Constitutional Symptoms: no fevers, no chills  - Eyes: no blurry vision no double vision  - Cardiovascular: no chest pain ,no palpitations  - Respiratory: no cough no shortness of breath  - Gastrointestinal: no dysphagia no  abdominal pain  - Musculoskeletal: no joint pains +  weakness  - Integumentary: no rashes  - Neurological: + numbness, tingling, no  headaches  -     Physical Examination:   Blood pressure 153/62, pulse 85, temperature 95.9 °F (35.5 °C), temperature source Oral, resp. rate 18, height 5' 2\" (1.575 m), weight 258 lb 3.2 oz (117.1 kg), SpO2 96 %. Estimated body mass index is 47.23 kg/(m^2) as calculated from the following:    Height as of this encounter: 5' 2\" (1.575 m). -   Weight as of this encounter: 258 lb 3.2 oz (117.1 kg).   - General: pleasant, no distress, good eye contact,obese  - HEENT: no pallor, no periorbital edema, EOMI  - Neck: supple, no thyromegaly  - Cardiovascular: regular, normal rate, normal S1 and S2  - Respiratory: clear to auscultation bilaterally  - Gastrointestinal: soft, nontender, nondistended,  BS +  - Musculoskeletal:  no acanthosis nigricans  - Foot exam -:lymphedema   - Neurological: alert and oriented  - Psychiatric: normal mood and affect  - Skin: color, texture, turgor normal.     Diabetic foot exam: February 2018    Left:    Vibratory sensation absent    Filament test absent sensation with micro filament   Pulse DP: 1 +    Deformities: Erythema, lymphedema,    Right:    Vibratory sensation absent   Filament test absent sensation with micro filament   Pulse DP: 1+                     Deformities: Callus, erythema, lymphedema    Data Reviewed:     [] Glucose records reviewed. [] See glucose records for details (to be scanned). [] A1C  [x] Reviewed labs    Creatinine normal, A1 C  Lab Results   Component Value Date/Time    Hemoglobin A1c 10.4 (H) 01/10/2017 09:29 AM    Hemoglobin A1c 10.9 (H) 01/20/2015 09:52 AM    Glucose 269 (H) 01/10/2017 09:29 AM    Glucose (POC) 158 (H) 03/18/2015 12:08 PM    Glucose  02/13/2018 03:04 PM    Microalb/Creat ratio (ug/mg creat.) 34.2 (H) 01/10/2017 09:29 AM    LDL, calculated 139 (H) 01/10/2017 09:29 AM    Creatinine 1.03 (H) 01/10/2017 09:29 AM    Hemoglobin A1c, External 9.3 12/02/2015    Hemoglobin A1c, External 10.6 05/07/2015 12:39 PM      Lab Results   Component Value Date/Time    Cholesterol, total 204 (H) 01/10/2017 09:29 AM    HDL Cholesterol 38 (L) 01/10/2017 09:29 AM    LDL, calculated 139 (H) 01/10/2017 09:29 AM    Triglyceride 133 01/10/2017 09:29 AM     Lab Results   Component Value Date/Time    ALT (SGPT) 15 01/10/2017 09:29 AM    AST (SGOT) 22 01/10/2017 09:29 AM    Alk. phosphatase 98 01/10/2017 09:29 AM    Bilirubin, total 0.4 01/10/2017 09:29 AM     Lab Results   Component Value Date/Time    GFR est AA 62 01/10/2017 09:29 AM    GFR est non-AA 54 (L) 01/10/2017 09:29 AM    Creatinine 1.03 (H) 01/10/2017 09:29 AM    BUN 16 01/10/2017 09:29 AM    Sodium 139 01/10/2017 09:29 AM    Potassium 5.7 (H) 01/10/2017 09:29 AM    Chloride 97 01/10/2017 09:29 AM    CO2 27 01/10/2017 09:29 AM      No results found for: TSH, TSHEXT     Assessment/Plan:     1. Type 2 diabetes mellitus with hyperglycemia, with long-term current use of insulin ([facility] [address]    2. Mixed hyperlipidemia    3. Essential hypertension    4. Cellulitis of right lower extremity        1.  Type 2 Diabetes Mellitus with nephropathy,uncontrolled  Lab Results   Component Value Date/Time    Hemoglobin A1c 10.4 (H) 01/10/2017 09:29 AM    Hemoglobin A1c (POC) 11.1 02/13/2018 03:04 PM    Hemoglobin A1c, External 9.3 12/02/2015     Glycemic control has worsened, A1c in October was 9.3, now it is more than 11   poorly controlled diabetes due to noncompliance with the medication regimen. Discussed with the family members who is with her, also with the patient at length, risk of amputation is very high. She has frequent infections due to poorly controlled diabetes and infection worsens her glycemic control in turn  She was on glucocorticoids recently for bronchitis also  Agreed to take Levemir and Humalog now, Trulicity according to her did not help so will discontinue  Levemir 60 units twice daily  Humalog 30 units before each meal  Low carbohydrate diet  She did not want to take mealtime insulin in the past but now agreeable        Advised to check glucose 4 times daily    2. HTN : Continue current therapy         3. Hyperlipidemia : Continue statin. 4.Obesity:Body mass index is 47.23 kg/(m^2).       5 PAD - PCI ,Lymphedema    6: Bilateral lower extremity cellulitis  Discontinue Keflex, Bactrim twice daily  She has an appointment with podiatrist Dr. Cole Ruelas on February 15  Marked the area of erythema, if it worsens or if she has fever she was asked to go to the ER  If no improvement with the oral antibiotics by the time she sees Dr. Cole Ruelas may need IV antibiotics   she has pre-ulcerative callus,         Patient Instructions   Continue Metformin    Stop Trulicity     Levemir 60 units in AM and 60 units at bedtime    Check sugars 4 times a day     Humalog or Novolog fast acting 30units before breakfast and 30 units lunch and 30 dinner  each meal     Additional Humalog or Novolog for high sugars based on the scale    Blood sugar Fast acting insulin Breakfast/Lunch/Dinner     150-200  Add 4 Units       201-250  Add  8 Units       251-300  Add 12 Units       301-350  Add 16  Units        351-400  Add 12 Units           Bring medicine bottles to your visit     Follow-up Disposition:  Return in about 7 weeks (around 4/3/2018). Thank you for allowing me to participate in the care of this patient. Raoul Camacho MD      Spent more than 40 minutes face-to-face with patient of which greater than 50% of the visit was spent in counseling, reviewed glucose log, and counseling regarding adjustment of insulin regimen.

## 2018-02-13 NOTE — PROGRESS NOTES
Liliam Gamble is a 78 y.o. female here for   Chief Complaint   Patient presents with    Diabetes       Functional glucose monitor and record keeping system? - yes  Eye exam within last year? - no, needs referral    1. Have you been to the ER, urgent care clinic since your last visit? Hospitalized since your last visit? [facility] DR SHAMIR LIU in Jan 9 2018 for 5 days for pneumonia, SOB     2. Have you seen or consulted any other health care providers outside of the [address] [place] [address] since your last visit? Include any pap smears or colon screening. -PCP      Lab Results   Component Value Date/Time    Hemoglobin A1c 10.4 (H) 01/10/2017 09:29 AM    Hemoglobin A1c (POC) 9.3 10/23/2017 04:05 PM    Hemoglobin A1c, External 9.3 12/02/2015       Wt Readings from Last 3 Encounters:   11/30/17 256 lb 12.8 oz (116.5 kg)   10/23/17 262 lb (118.8 kg)   01/17/17 262 lb (118.8 kg)     Temp Readings from Last 3 Encounters:   11/30/17 96.5 °F (35.8 °C) (Oral)   10/23/17 96.4 °F (35.8 °C) (Oral)   01/17/17 98.2 °F (36.8 °C) (Oral)     BP Readings from Last 3 Encounters:   11/30/17 137/62   10/23/17 165/62   01/17/17 171/76     Pulse Readings from Last 3 Encounters:   11/30/17 68   10/23/17 62   01/17/17 76

## 2018-02-14 DIAGNOSIS — E11.65 TYPE 2 DIABETES MELLITUS WITH HYPERGLYCEMIA, WITH LONG-TERM CURRENT USE OF INSULIN (HCC): Primary | ICD-10-CM

## 2018-02-14 DIAGNOSIS — Z79.4 TYPE 2 DIABETES MELLITUS WITH HYPERGLYCEMIA, WITH LONG-TERM CURRENT USE OF INSULIN (HCC): Primary | ICD-10-CM

## 2018-02-14 RX ORDER — INSULIN ASPART 100 [IU]/ML
INJECTION, SOLUTION INTRAVENOUS; SUBCUTANEOUS
Qty: 45 ML | Refills: 11 | Status: SHIPPED | OUTPATIENT
Start: 2018-02-14 | End: 2018-03-30 | Stop reason: SDUPTHER

## 2018-02-15 ENCOUNTER — TELEPHONE (OUTPATIENT)
Dept: ENDOCRINOLOGY | Age: 78
End: 2018-02-15

## 2018-02-15 NOTE — TELEPHONE ENCOUNTER
Emergency contact stated the patient is now hospitalize and she needed to speak with the nurse about her condition.

## 2018-02-16 NOTE — TELEPHONE ENCOUNTER
Spoke with Belen Johnson, she states pt was admitted to [facility] yesterday morning. She states the pt is not understanding to how/when to take her insulin. At 6AM she took Levemir 60 units and then 1-2 hours later she ate but didn't take meal time insulin. Her son had to call the ambulance as pt could not stand up. Her BG at the time of ambulance arrival was 525. Pt is now in hospital on O2. Ms Estrella Newton states blisters are still forming and pt missed foot appt with Dr Scotty Duke due to being admitted. Ms Estrella Newton says they are now considering home health. Ms Estrella Newton says Dr Brea Herring can call to discuss further if needed.

## 2018-02-19 NOTE — TELEPHONE ENCOUNTER
----- Message from Liliana Miles sent at 2/15/2018 12:44 PM EST -----  Regarding: Dr. Natalie Mcgee  The patient's sister in law Belen Johnson is letting the doctor know that the patient has been admitted into [facility] for taking her insulin incorrectly again and is requesting a call back to discuss this.  P)[phone]

## 2018-02-26 ENCOUNTER — OFFICE VISIT (OUTPATIENT)
Dept: ENDOCRINOLOGY | Age: 78
End: 2018-02-26

## 2018-02-26 VITALS
DIASTOLIC BLOOD PRESSURE: 43 MMHG | SYSTOLIC BLOOD PRESSURE: 99 MMHG | WEIGHT: 243.2 LBS | RESPIRATION RATE: 16 BRPM | OXYGEN SATURATION: 97 % | BODY MASS INDEX: 44.75 KG/M2 | HEART RATE: 80 BPM | HEIGHT: 62 IN | TEMPERATURE: 95.3 F

## 2018-02-26 DIAGNOSIS — E11.65 TYPE 2 DIABETES MELLITUS WITH HYPERGLYCEMIA, WITH LONG-TERM CURRENT USE OF INSULIN (HCC): Primary | ICD-10-CM

## 2018-02-26 DIAGNOSIS — Z79.4 TYPE 2 DIABETES MELLITUS WITH HYPERGLYCEMIA, WITH LONG-TERM CURRENT USE OF INSULIN (HCC): Primary | ICD-10-CM

## 2018-02-26 DIAGNOSIS — E78.2 MIXED HYPERLIPIDEMIA: ICD-10-CM

## 2018-02-26 DIAGNOSIS — I10 ESSENTIAL HYPERTENSION: ICD-10-CM

## 2018-02-26 RX ORDER — DOXYCYCLINE 100 MG/1
100 CAPSULE ORAL 2 TIMES DAILY
COMMUNITY
End: 2018-03-30 | Stop reason: ALTCHOICE

## 2018-02-26 RX ORDER — ALBUTEROL SULFATE 90 UG/1
AEROSOL, METERED RESPIRATORY (INHALATION)
COMMUNITY

## 2018-02-26 RX ORDER — ATORVASTATIN CALCIUM 10 MG/1
10 TABLET, FILM COATED ORAL
COMMUNITY

## 2018-02-26 RX ORDER — CILOSTAZOL 50 MG/1
TABLET ORAL
COMMUNITY

## 2018-02-26 RX ORDER — NYSTATIN 100000 [USP'U]/G
POWDER TOPICAL 2 TIMES DAILY
COMMUNITY

## 2018-02-26 RX ORDER — LISINOPRIL 5 MG/1
5 TABLET ORAL DAILY
COMMUNITY

## 2018-02-26 RX ORDER — POTASSIUM CHLORIDE 20 MEQ/1
40 TABLET, EXTENDED RELEASE ORAL 2 TIMES DAILY
COMMUNITY

## 2018-02-26 RX ORDER — CARVEDILOL 3.12 MG/1
TABLET ORAL 2 TIMES DAILY WITH MEALS
COMMUNITY

## 2018-02-26 NOTE — PROGRESS NOTES
Eder Vergara is a 78 y.o. female here for   Chief Complaint   Patient presents with    Diabetes       Functional glucose monitor and record keeping system? - yes  Eye exam within last year? - referral placed  Foot exam within last year? - on file    1. Have you been to the ER, urgent care clinic since your last visit? Hospitalized since your last visit? [facility] last week for foot     2. Have you seen or consulted any other health care providers outside of the [address] [place] [address] since your last visit?   Include any pap smears or colon screening.-no      Lab Results   Component Value Date/Time    Hemoglobin A1c 10.4 (H) 01/10/2017 09:29 AM    Hemoglobin A1c (POC) 11.1 02/13/2018 03:04 PM    Hemoglobin A1c, External 9.3 12/02/2015       Wt Readings from Last 3 Encounters:   02/13/18 258 lb 3.2 oz (117.1 kg)   11/30/17 256 lb 12.8 oz (116.5 kg)   10/23/17 262 lb (118.8 kg)     Temp Readings from Last 3 Encounters:   02/13/18 95.9 °F (35.5 °C) (Oral)   11/30/17 96.5 °F (35.8 °C) (Oral)   10/23/17 96.4 °F (35.8 °C) (Oral)     BP Readings from Last 3 Encounters:   02/13/18 153/62   11/30/17 137/62   10/23/17 165/62     Pulse Readings from Last 3 Encounters:   02/13/18 85   11/30/17 68   10/23/17 62

## 2018-02-26 NOTE — PROGRESS NOTES
Rabia Tran MD          Patient Information  Date:2/26/2018  Name : Holly Mak 78 y.o.     YOB: 1940         Referred by: Hasmukh Wise NP         Chief Complaint   Patient presents with    Diabetes       History of Present Illness: Holly Mak is a 78 y.o. female here for follow up of  Type 2 Diabetes Mellitus. type 2 diabetes, long-standing history of uncontrolled diabetes, complicated by peripheral neuropathy, foot ulcer    February 26, 2018    She was hospitalized  for difficulty breathing, severe hyperglycemia, sister-in-law with the patient reported that she was not taking the insulin consistently. During the hospitalization the insulin has been decreased to 20 units of Levemir  Blood glucoses in 200s  She has home health now  Diet healthy  She had diabetic foot ulcer infection, was on IV antibiotics    Prior history February 2018   She had nonspecific side effects to multiple medications, Humalog was discontinued in the past as she c/o cramping in the legs and Trulicity was started. She is not taking Trulicity now as she ran out of the prescription. She also self stopped Levemir as it did not work. She is taking Humalog 20 units now before each meal, she has Humalog pens with her which was initially prescribed in September 2016. In the past she refused to take Humalog or even NovoLog  She is forgetting insulin. She has checked her blood glucose and they are ranging from 300-400  Has foot pain, complains of redness, swelling    Prior history  Self changes insulin, follow-up with me is very poor, she was seen in January 2017 and November 2017  The regimen changes every time she follows up with me    She has preulcerative callus      In the past, she had mentioned being unable to afford a healthy diet and hence on a high carb diet.      She has history of lymphedema and was followed by lymphedema clinic in the past    No regular exercise. Wt Readings from Last 3 Encounters:   02/26/18 243 lb 3.2 oz (110.3 kg)   02/13/18 258 lb 3.2 oz (117.1 kg)   11/30/17 256 lb 12.8 oz (116.5 kg)       BP Readings from Last 3 Encounters:   02/26/18 99/43   02/13/18 153/62   11/30/17 137/62           Past Medical History:   Diagnosis Date    HTN (hypertension)     Hyperlipidemia     Hypothyroid     Lymphedema     Type II or unspecified type diabetes mellitus with neurological manifestations, uncontrolled(250.62) ([facility])      Current Outpatient Prescriptions   Medication Sig    potassium chloride (K-DUR, KLOR-CON) 20 mEq tablet Take  by mouth two (2) times a day.  carvedilol (COREG) 3.125 mg tablet Take  by mouth two (2) times daily (with meals).  atorvastatin (LIPITOR) 10 mg tablet Take  by mouth daily.  cilostazol (PLETAL) 50 mg tablet Take  by mouth Before breakfast and dinner.  lisinopril (PRINIVIL, ZESTRIL) 5 mg tablet Take 2.5 mg by mouth daily.  doxycycline (MONODOX) 100 mg capsule Take 100 mg by mouth two (2) times a day.  nystatin (MYCOSTATIN) powder Apply  to affected area two (2) times a day.  albuterol (PROVENTIL HFA, VENTOLIN HFA, PROAIR HFA) 90 mcg/actuation inhaler Take  by inhalation.  insulin aspart (NOVOLOG) 100 unit/mL inpn 30 units before each meal with SSI ,Max daily units 120 (Patient taking differently: SSI only)    ammonium lactate (AMLACTIN) 12 % topical cream Apply  to affected area two (2) times a day. rub in to affected area well    insulin detemir (LEVEMIR FLEXTOUCH) 100 unit/mL (3 mL) inpn INJECT 60 units BID  . Corrinne Sherman Corrinne Sherman THIS  IS  A  DOSE  CHANGE. .. (Patient taking differently: Inject 20 units in the AM)    metFORMIN (GLUCOPHAGE) 1,000 mg tablet Take 1 Tab by mouth two (2) times daily (with meals).  Insulin Needles, Disposable, (BD INSULIN PEN NEEDLE UF MINI) 31 gauge x 3/16\" ndle Use to inject Levemir twice daily.  Dx code E11.65    glucose blood VI test strips (ONETOUCH ULTRA TEST) strip Test blood glucose 3 times daily Dx Code: E11.65    Blood-Glucose Meter (ONE TOUCH ULTRAMINI) monitoring kit Test blood glucose 3 times daily    Blood-Glucose Meter monitoring kit Test blood glucose 3 times daily    Lancets misc Test blood glucose 3 times daily    furosemide (LASIX) 40 mg tablet Take 40-80 mg by mouth daily.  aspirin delayed-release 81 mg tablet Take 81 mg by mouth daily.  TRULICITY 2.28 OF/4.6 mL sub-q pen INJECT O.5 ML SUBCUTANEOUSLY EVERY 7 DAYS    METOLAZONE PO Take  by mouth.  metoprolol succinate (TOPROL-XL) 25 mg XL tablet Take 25 mg by mouth daily    traMADol (ULTRAM) 50 mg tablet Take 50 mg by mouth every six (6) hours as needed for Pain.  meclizine (ANTIVERT) 25 mg tablet Take  by mouth three (3) times daily as needed.  trolamine salicylate (ASPERCREME) 10 % lotion Apply  to affected area.  FOLIC ACID/MULTIVIT-MIN/LUTEIN (CENTRUM SILVER PO) Take  by mouth.  naproxen (NAPROSYN) 250 mg tablet Take 250 mg by mouth two (2) times daily as needed for Pain.  valsartan (DIOVAN) 80 mg tablet Take 80 mg by mouth daily.  pravastatin (PRAVACHOL) 40 mg tablet Take 40 mg by mouth nightly. No current facility-administered medications for this visit.       Allergies   Allergen Reactions    Lortab [Hydrocodone-Acetaminophen] Other (comments)     hallucinations    Phenergan [Promethazine] Other (comments)     Hallucination    Cephalexin Vertigo    Metolazone Other (comments)     Pt gets \"paralyzed\"    Oxycodone Other (comments)     hallucination         Review of Systems:  - Constitutional Symptoms: no fevers, no chills  - Eyes: no blurry vision no double vision  - Cardiovascular: no chest pain ,no palpitations  - Respiratory: no cough no shortness of breath  - Gastrointestinal: no dysphagia no  abdominal pain  - Musculoskeletal: no joint pains +  weakness  - Integumentary: no rashes  - Neurological: + numbness, tingling, no  headaches  -     Physical Examination:   Blood pressure 99/43, pulse 80, temperature 95.3 °F (35.2 °C), temperature source Oral, resp. rate 16, height 5' 2\" (1.575 m), weight 243 lb 3.2 oz (110.3 kg), SpO2 97 %. Estimated body mass index is 44.48 kg/(m^2) as calculated from the following:    Height as of this encounter: 5' 2\" (1.575 m). -   Weight as of this encounter: 243 lb 3.2 oz (110.3 kg). - General: pleasant, no distress, good eye contact,obese  - HEENT: no pallor, no periorbital edema, EOMI  - Neck: supple, no thyromegaly  - Cardiovascular: regular, normal rate, normal S1 and S2  - Respiratory: clear to auscultation bilaterally  - Gastrointestinal: soft, nontender, nondistended,  BS +  - Musculoskeletal:  no acanthosis nigricans  - Foot exam -:lymphedema   - Neurological: alert and oriented  - Psychiatric: normal mood and affect  - Skin: color, texture, turgor normal.     Diabetic foot exam: February 2018    Left:    Vibratory sensation absent    Filament test absent sensation with micro filament   Pulse DP: 1 +    Deformities: Erythema, lymphedema,    Right:    Vibratory sensation absent   Filament test absent sensation with micro filament   Pulse DP: 1+                     Deformities: Callus, erythema, lymphedema    Data Reviewed:     [] Glucose records reviewed. [] See glucose records for details (to be scanned).   [] A1C  [x] Reviewed labs    Creatinine normal, A1 C  Lab Results   Component Value Date/Time    Hemoglobin A1c 10.4 (H) 01/10/2017 09:29 AM    Hemoglobin A1c 10.9 (H) 01/20/2015 09:52 AM    Glucose 269 (H) 01/10/2017 09:29 AM    Glucose (POC) 158 (H) 03/18/2015 12:08 PM    Glucose  02/13/2018 03:04 PM    Microalb/Creat ratio (ug/mg creat.) 34.2 (H) 01/10/2017 09:29 AM    LDL, calculated 139 (H) 01/10/2017 09:29 AM    Creatinine 1.03 (H) 01/10/2017 09:29 AM    Hemoglobin A1c, External 9.3 12/02/2015    Hemoglobin A1c, External 10.6 05/07/2015 12:39 PM      Lab Results   Component Value Date/Time    Cholesterol, total 204 (H) 01/10/2017 09:29 AM    HDL Cholesterol 38 (L) 01/10/2017 09:29 AM    LDL, calculated 139 (H) 01/10/2017 09:29 AM    Triglyceride 133 01/10/2017 09:29 AM     Lab Results   Component Value Date/Time    ALT (SGPT) 15 01/10/2017 09:29 AM    AST (SGOT) 22 01/10/2017 09:29 AM    Alk. phosphatase 98 01/10/2017 09:29 AM    Bilirubin, total 0.4 01/10/2017 09:29 AM     Lab Results   Component Value Date/Time    GFR est AA 62 01/10/2017 09:29 AM    GFR est non-AA 54 (L) 01/10/2017 09:29 AM    Creatinine 1.03 (H) 01/10/2017 09:29 AM    BUN 16 01/10/2017 09:29 AM    Sodium 139 01/10/2017 09:29 AM    Potassium 5.7 (H) 01/10/2017 09:29 AM    Chloride 97 01/10/2017 09:29 AM    CO2 27 01/10/2017 09:29 AM      No results found for: TSH, TSHEXT     Assessment/Plan:     1. Type 2 diabetes mellitus with hyperglycemia, with long-term current use of insulin ([address]    2. Mixed hyperlipidemia    3. Essential hypertension        1. Type 2 Diabetes Mellitus with nephropathy,uncontrolled  Lab Results   Component Value Date/Time    Hemoglobin A1c 10.4 (H) 01/10/2017 09:29 AM    Hemoglobin A1c (POC) 11.1 02/13/2018 03:04 PM    Hemoglobin A1c, External 9.3 12/02/2015       poorly controlled diabetes due to noncompliance with the medication regimen. Levemir 30 units at night  Humalog or NovoLog 10 units before each meal  Discussed the plan with her sister-in-law, home health nurse as well as the patient  Low carbohydrate diet  She did not want to take mealtime insulin in the past but now agreeable        Advised to check glucose 4 times daily    2. HTN : Continue current therapy         3. Hyperlipidemia : Continue statin. 4.Obesity:Body mass index is 44.48 kg/(m^2). 5 PAD - PCI ,Lymphedema    6. Foot ulcer:  Followed by wound clinic        Patient Instructions   Continue Metformin      Levemir 30 units at 7 PM  for 1 week and if sugar is still more than 130 in AM increase to 40 units     Check sugars before meals Humalog or Novolog fast acting 10 units before breakfast and 10 units lunch and 10 dinner  each meal     Additional Humalog or Novolog for high sugars based on the scale    Blood sugar Fast acting insulin Breakfast/Lunch/Dinner     150-200  Add 2 Units       201-250  Add 4 Units       251-300  Add 6 Units       301-350  Add 8  Units        351-400  Add 10 Units           Bring medicine bottles to your visit     Follow-up Disposition:  Return in about 3 months (around 5/26/2018). Thank you for allowing me to participate in the care of this patient. Jennifer Hayes MD      Spent more than 40 minutes face-to-face with patient of which greater than 50% of the visit was spent in counseling, reviewed glucose log, and counseling regarding adjustment of insulin regimen.

## 2018-02-26 NOTE — MR AVS SNAPSHOT
[address] [facility] [address] [facility] [address] 
[phone] Patient: Reagan Hooker MRN: [id number] VLX:[id number] Visit Information Date & Time Provider Department Dept. Phone Encounter #  
 2/26/2018  2:15 PM Fabiola Zhong MD [facility] [phone] [id number] Follow-up Instructions Return in about 3 months (around 5/26/2018). Your Appointments 3/30/2018  9:00 AM  
ROUTINE CARE with Fabiola Zhong MD  
[facility] [address] [facility]) Appt Note: f/u 7 weeks per Dr. Jacques Molina [address] [facility] [address]  
[phone]  
  
   
 [address] [place] [id number] Upcoming Health Maintenance Date Due  
 EYE EXAM RETINAL OR DILATED Q1 8/22/1952 DTaP/Tdap/Td series (1 - Tdap) 8/22/1963 ZOSTER VACCINE AGE 60> 6/22/2002 GLAUCOMA SCREENING Q2Y 8/22/2007 OSTEOPOROSIS SCREENING (DEXA) 8/22/2007 Pneumococcal 65+ Low/Medium Risk (1 of 2 - PCV13) 8/22/2007 MEDICARE YEARLY EXAM 8/22/2007 Influenza Age 5 to Adult 8/1/2017 MICROALBUMIN Q1 1/10/2018 LIPID PANEL Q1 1/10/2018 HEMOGLOBIN A1C Q6M 8/13/2018 FOOT EXAM Q1 2/13/2019 Allergies as of 2/26/2018  Review Complete On: 2/26/2018 By: Mikal Dwyer LPN Severity Noted Reaction Type Reactions Lortab [Hydrocodone-acetaminophen] High 09/17/2014    Other (comments)  
 hallucinations Phenergan [Promethazine] High 01/27/2015    Other (comments) Hallucination Cephalexin  05/16/2016    Vertigo Metolazone  02/26/2018    Other (comments) Pt gets \"paralyzed\" Oxycodone  09/17/2014    Other (comments)  
 hallucination Current Immunizations  Never Reviewed No immunizations on file. Not reviewed this visit You Were Diagnosed With   
  
 Codes Comments  Type 2 diabetes mellitus with hyperglycemia, with long-term current use of insulin ([facility]    -  Primary ICD-10-CM: E11.65, Z79.4 ICD-9-CM: 250.00, 790.29, V58.67 Mixed hyperlipidemia     ICD-10-CM: E78.2 ICD-9-CM: 272.2 Essential hypertension     ICD-10-CM: I10 
ICD-9-CM: 401.9 Vitals BP Pulse Temp Resp Height(growth percentile) Weight(growth percentile) 99/43 (BP 1 Location: Left arm, BP Patient Position: Sitting) 80 95.3 °F (35.2 °C) (Oral) 16 5' 2\" (1.575 m) 243 lb 3.2 oz (110.3 kg) SpO2 BMI OB Status Smoking Status 97% 44.48 kg/m2 Menopause Never Smoker Vitals History BMI and BSA Data Body Mass Index Body Surface Area 44.48 kg/m 2 2.2 m 2 Preferred Pharmacy Pharmacy Name Phone [facility] [address] [phone] Your Updated Medication List  
  
   
This list is accurate as of 2/26/18  3:03 PM.  Always use your most recent med list.  
  
  
  
  
 albuterol 90 mcg/actuation inhaler Commonly known as:  PROVENTIL HFA, VENTOLIN HFA, PROAIR HFA Take  by inhalation. ammonium lactate 12 % topical cream  
Commonly known as:  AMLACTIN Apply  to affected area two (2) times a day. rub in to affected area well ASPERCREME 10 % lotion Generic drug:  trolamine salicylate Apply  to affected area. aspirin delayed-release 81 mg tablet Take 81 mg by mouth daily. atorvastatin 10 mg tablet Commonly known as:  LIPITOR Take  by mouth daily. * Blood-Glucose Meter monitoring kit Test blood glucose 3 times daily * Blood-Glucose Meter monitoring kit Commonly known as:  Amanda Everts Test blood glucose 3 times daily  
  
 carvedilol 3.125 mg tablet Commonly known as:  [place] Take  by mouth two (2) times daily (with meals). CENTRUM SILVER PO Take  by mouth.  
  
 cilostazol 50 mg tablet Commonly known as:  PLETAL Take  by mouth Before breakfast and dinner. DIOVAN 80 mg tablet Generic drug:  valsartan Take 80 mg by mouth daily. doxycycline 100 mg capsule Commonly known as:  [place] Take 100 mg by mouth two (2) times a day. furosemide 40 mg tablet Commonly known as:  LASIX Take 40-80 mg by mouth daily. glucose blood VI test strips strip Commonly known as:  ONETOUCH ULTRA TEST Test blood glucose 3 times daily Dx Code: E11.65  
  
 insulin aspart U-100 100 unit/mL Inpn Commonly known as:  Gonsalo Caller 30 units before each meal with SSI ,Max daily units 120  
  
 insulin detemir U-100 100 unit/mL (3 mL) Inpn Commonly known as:  LEVEMIR FLEXTOUCH U-100 INSULN INJECT 60 units BID  . Marin Santizo THIS  IS  A  DOSE  CHANGE. .. Insulin Needles (Disposable) 31 gauge x 3/16\" Ndle Commonly known as:  BD INSULIN PEN NEEDLE UF MINI Use to inject Levemir twice daily. Dx code E11.65 Lancets Misc Test blood glucose 3 times daily  
  
 lisinopril 5 mg tablet Commonly known as:  [place] Take 2.5 mg by mouth daily. meclizine 25 mg tablet Commonly known as:  ANTIVERT Take  by mouth three (3) times daily as needed. metFORMIN 1,000 mg tablet Commonly known as:  GLUCOPHAGE Take 1 Tab by mouth two (2) times daily (with meals). METOLAZONE PO Take  by mouth.  
  
 metoprolol succinate 25 mg XL tablet Commonly known as:  TOPROL-XL Take 25 mg by mouth daily  
  
 naproxen 250 mg tablet Commonly known as:  NAPROSYN Take 250 mg by mouth two (2) times daily as needed for Pain. nystatin powder Commonly known as:  MYCOSTATIN Apply  to affected area two (2) times a day. potassium chloride 20 mEq tablet Commonly known as:  K-DUR, KLOR-CON Take  by mouth two (2) times a day. pravastatin 40 mg tablet Commonly known as:  PRAVACHOL Take 40 mg by mouth nightly. traMADol 50 mg tablet Commonly known as:  ULTRAM  
Take 50 mg by mouth every six (6) hours as needed for Pain. TRULICITY 1.84 EH/4.5 mL sub-q pen Generic drug:  dulaglutide INJECT O.5 ML SUBCUTANEOUSLY EVERY 7 DAYS * Notice: This list has 2 medication(s) that are the same as other medications prescribed for you. Read the directions carefully, and ask your doctor or other care provider to review them with you. Follow-up Instructions Return in about 3 months (around 5/26/2018). Patient Instructions Continue Metformin Levemir 30 units at 7 PM  for 1 week and if sugar is still more than 130 in AM increase to 40 units Check sugars before meals Humalog or Novolog fast acting 10 units before breakfast and 10 units lunch and 10 dinner  each meal  
 
Additional Humalog or Novolog for high sugars based on the scale Blood sugar Fast acting insulin Breakfast/Lunch/Dinner 150-200  Add 2 Units 201-250  Add 4 Units 251-300  Add 6 Units 301-350  Add 8  Units 351-400  Add 10 Units Bring medicine bottles to your visit Introducing [facility]! [facility] introduces [employer] patient portal. Now you can access parts of your medical record, email your doctor's office, and request medication refills online. 1. In your internet browser, go to [email or web address] 2. Click on the First Time User? Click Here link in the Sign In box. You will see the New Member Sign Up page. 3. Enter your [employer] Access Code exactly as it appears below. You will not need to use this code after youve completed the sign-up process. If you do not sign up before the expiration date, you must request a new code. · [employer] Access Code: [email or web address] Expires: 5/14/2018  3:36 PM 
 
4. Enter the last four digits of your Social Security Number (xxxx) and Date of Birth (mm/dd/yyyy) as indicated and click Submit. You will be taken to the next sign-up page. 5. Create a C2[employer] ID.  This will be your [employer] login ID and cannot be changed, so think of one that is secure and easy to remember. 6. Create a Reds10 password. You can change your password at any time. 7. Enter your Password Reset Question and Answer. This can be used at a later time if you forget your password. 8. Enter your e-mail address. You will receive e-mail notification when new information is available in [address] 9. Click Sign Up. You can now view and download portions of your medical record. 10. Click the Download Summary menu link to download a portable copy of your medical information. If you have questions, please visit the Frequently Asked Questions section of the [employer] website. Remember, [employer] is NOT to be used for urgent needs. For medical emergencies, dial 911. Now available from your iPhone and Android! Please provide this summary of care documentation to your next provider. Your primary care clinician is listed as Jenn Mcgraw. If you have any questions after today's visit, please call [phone].

## 2018-02-26 NOTE — PATIENT INSTRUCTIONS
Continue Metformin      Levemir 30 units at 7 PM  for 1 week and if sugar is still more than 130 in AM increase to 40 units     Check sugars before meals     Humalog or Novolog fast acting 10 units before breakfast and 10 units lunch and 10 dinner  each meal     Additional Humalog or Novolog for high sugars based on the scale    Blood sugar Fast acting insulin Breakfast/Lunch/Dinner     150-200  Add 2 Units       201-250  Add 4 Units       251-300  Add 6 Units       301-350  Add 8  Units        351-400  Add 10 Units           Bring medicine bottles to your visit

## 2018-03-30 ENCOUNTER — OFFICE VISIT (OUTPATIENT)
Dept: ENDOCRINOLOGY | Age: 78
End: 2018-03-30

## 2018-03-30 ENCOUNTER — TELEPHONE (OUTPATIENT)
Dept: ENDOCRINOLOGY | Age: 78
End: 2018-03-30

## 2018-03-30 VITALS
TEMPERATURE: 95.5 F | DIASTOLIC BLOOD PRESSURE: 56 MMHG | OXYGEN SATURATION: 96 % | HEIGHT: 62 IN | HEART RATE: 76 BPM | BODY MASS INDEX: 45.51 KG/M2 | RESPIRATION RATE: 16 BRPM | WEIGHT: 247.3 LBS | SYSTOLIC BLOOD PRESSURE: 106 MMHG

## 2018-03-30 DIAGNOSIS — E11.65 TYPE 2 DIABETES MELLITUS WITH HYPERGLYCEMIA, WITH LONG-TERM CURRENT USE OF INSULIN (HCC): ICD-10-CM

## 2018-03-30 DIAGNOSIS — L03.115 CELLULITIS OF RIGHT LOWER EXTREMITY: ICD-10-CM

## 2018-03-30 DIAGNOSIS — E78.2 MIXED HYPERLIPIDEMIA: ICD-10-CM

## 2018-03-30 DIAGNOSIS — E11.65 TYPE 2 DIABETES MELLITUS WITH HYPERGLYCEMIA, WITH LONG-TERM CURRENT USE OF INSULIN (HCC): Primary | ICD-10-CM

## 2018-03-30 DIAGNOSIS — I10 ESSENTIAL HYPERTENSION: ICD-10-CM

## 2018-03-30 DIAGNOSIS — Z79.4 TYPE 2 DIABETES MELLITUS WITH HYPERGLYCEMIA, WITH LONG-TERM CURRENT USE OF INSULIN (HCC): ICD-10-CM

## 2018-03-30 DIAGNOSIS — Z79.4 TYPE 2 DIABETES MELLITUS WITH HYPERGLYCEMIA, WITH LONG-TERM CURRENT USE OF INSULIN (HCC): Primary | ICD-10-CM

## 2018-03-30 RX ORDER — INSULIN ASPART 100 [IU]/ML
INJECTION, SOLUTION INTRAVENOUS; SUBCUTANEOUS
Qty: 60 ML | Refills: 4 | Status: SHIPPED | OUTPATIENT
Start: 2018-03-30 | End: 2018-01-01 | Stop reason: SDUPTHER

## 2018-03-30 RX ORDER — ONDANSETRON 4 MG/1
4 TABLET, FILM COATED ORAL
COMMUNITY

## 2018-03-30 NOTE — PATIENT INSTRUCTIONS
Continue Metformin      Levemir 40 units at 7 PM      Check sugars before meals     Humalog or Novolog fast acting 12 units before breakfast and 12 units lunch and 12 dinner  each meal  For 2 weeks and if still sugars are high, then Novolog 15 units before meals   No Novolog if sugars are less 70     Additional Humalog or Novolog for high sugars based on the scale    Blood sugar Fast acting insulin Breakfast/Lunch/Dinner     150-200  Add 2 Units       201-250  Add 4 Units       251-300  Add 6 Units       301-350  Add 8  Units        351-400  Add 10 Units           Bring medicine bottles to your visit

## 2018-03-30 NOTE — PROGRESS NOTES
Gwen Dent MD          Patient Information  Date:3/30/2018  Name : Jazmyne Norris 78 y.o.     YOB: 1940         Referred by: Caren Garcia NP         Chief Complaint   Patient presents with    Diabetes    Cholesterol Problem    Hypertension       History of Present Illness: Jazmyne Norris is a 78 y.o. female here for follow up of  Type 2 Diabetes Mellitus. type 2 diabetes, long-standing history of uncontrolled diabetes, complicated by peripheral neuropathy, foot ulcer  She is taking insulin as family is helping   Levemir 40 units , Novolog 10 + SSI   Diet could be better , she is drinking 1 soda a day   No activity at all   Eats at [facility]       February 26, 2018    She was hospitalized  for difficulty breathing, severe hyperglycemia, sister-in-law with the patient reported that she was not taking the insulin consistently. During the hospitalization the insulin has been decreased to 20 units of Levemir  Blood glucoses in 200s  She has home health now  Diet healthy  She had diabetic foot ulcer infection, was on IV antibiotics    Prior history February 2018   She had nonspecific side effects to multiple medications, Humalog was discontinued in the past as she c/o cramping in the legs and Trulicity was started. She is not taking Trulicity now as she ran out of the prescription. She also self stopped Levemir as it did not work. She is taking Humalog 20 units now before each meal, she has Humalog pens with her which was initially prescribed in September 2016. In the past she refused to take Humalog or even NovoLog  She is forgetting insulin.   She has checked her blood glucose and they are ranging from 300-400  Has foot pain, complains of redness, swelling    Prior history  Self changes insulin, follow-up with me is very poor, she was seen in January 2017 and November 2017  The regimen changes every time she follows up with me    She has preulcerative callus      In the past, she had mentioned being unable to afford a healthy diet and hence on a high carb diet. She has history of lymphedema and was followed by lymphedema clinic in the past    No regular exercise. Wt Readings from Last 3 Encounters:   02/26/18 243 lb 3.2 oz (110.3 kg)   02/13/18 258 lb 3.2 oz (117.1 kg)   11/30/17 256 lb 12.8 oz (116.5 kg)       BP Readings from Last 3 Encounters:   02/26/18 99/43   02/13/18 153/62   11/30/17 137/62           Past Medical History:   Diagnosis Date    HTN (hypertension)     Hyperlipidemia     Hypothyroid     Lymphedema     Type II or unspecified type diabetes mellitus with neurological manifestations, uncontrolled(250.62) (HCC)      Current Outpatient Prescriptions   Medication Sig    potassium chloride (K-DUR, KLOR-CON) 20 mEq tablet Take  by mouth two (2) times a day.  carvedilol (COREG) 3.125 mg tablet Take  by mouth two (2) times daily (with meals).  atorvastatin (LIPITOR) 10 mg tablet Take  by mouth daily.  cilostazol (PLETAL) 50 mg tablet Take  by mouth Before breakfast and dinner.  lisinopril (PRINIVIL, ZESTRIL) 5 mg tablet Take 2.5 mg by mouth daily.  doxycycline (MONODOX) 100 mg capsule Take 100 mg by mouth two (2) times a day.  nystatin (MYCOSTATIN) powder Apply  to affected area two (2) times a day.  albuterol (PROVENTIL HFA, VENTOLIN HFA, PROAIR HFA) 90 mcg/actuation inhaler Take  by inhalation.  insulin aspart (NOVOLOG) 100 unit/mL inpn 30 units before each meal with SSI ,Max daily units 120 (Patient taking differently: SSI only)    ammonium lactate (AMLACTIN) 12 % topical cream Apply  to affected area two (2) times a day. rub in to affected area well    insulin detemir (LEVEMIR FLEXTOUCH) 100 unit/mL (3 mL) inpn INJECT 60 units BID  . Wil Barbosa THIS  IS  A  DOSE  CHANGE. .. (Patient taking differently: Inject 20 units in the AM)    metFORMIN (GLUCOPHAGE) 1,000 mg tablet Take 1 Tab by mouth two (2) times daily (with meals).  TRULICITY 3.82 YW/1.7 mL sub-q pen INJECT O.5 ML SUBCUTANEOUSLY EVERY 7 DAYS    METOLAZONE PO Take  by mouth.  metoprolol succinate (TOPROL-XL) 25 mg XL tablet Take 25 mg by mouth daily    Insulin Needles, Disposable, (BD INSULIN PEN NEEDLE UF MINI) 31 gauge x 3/16\" ndle Use to inject Levemir twice daily. Dx code E11.65    traMADol (ULTRAM) 50 mg tablet Take 50 mg by mouth every six (6) hours as needed for Pain.  meclizine (ANTIVERT) 25 mg tablet Take  by mouth three (3) times daily as needed.  trolamine salicylate (ASPERCREME) 10 % lotion Apply  to affected area.  FOLIC ACID/MULTIVIT-MIN/LUTEIN (CENTRUM SILVER PO) Take  by mouth.  glucose blood VI test strips (ONETOUCH ULTRA TEST) strip Test blood glucose 3 times daily Dx Code: E11.65    naproxen (NAPROSYN) 250 mg tablet Take 250 mg by mouth two (2) times daily as needed for Pain.  Blood-Glucose Meter (ONE TOUCH ULTRAMINI) monitoring kit Test blood glucose 3 times daily    Blood-Glucose Meter monitoring kit Test blood glucose 3 times daily    Lancets misc Test blood glucose 3 times daily    valsartan (DIOVAN) 80 mg tablet Take 80 mg by mouth daily.  furosemide (LASIX) 40 mg tablet Take 40-80 mg by mouth daily.  aspirin delayed-release 81 mg tablet Take 81 mg by mouth daily.  pravastatin (PRAVACHOL) 40 mg tablet Take 40 mg by mouth nightly. No current facility-administered medications for this visit.       Allergies   Allergen Reactions    Lortab [Hydrocodone-Acetaminophen] Other (comments)     hallucinations    Phenergan [Promethazine] Other (comments)     Hallucination    Cephalexin Vertigo    Metolazone Other (comments)     Pt gets \"paralyzed\"    Oxycodone Other (comments)     hallucination         Review of Systems:  - Constitutional Symptoms: no fevers, no chills  - Eyes: no blurry vision no double vision  - Cardiovascular: no chest pain ,no palpitations  - Respiratory: no cough no shortness of breath  - Gastrointestinal: no dysphagia no  abdominal pain  - Musculoskeletal: no joint pains +  weakness  - Integumentary: no rashes  - Neurological: + numbness, tingling, no  headaches  -     Physical Examination:   Height 5' 2\" (1.575 m). Estimated body mass index is 44.48 kg/(m^2) as calculated from the following:    Height as of 2/26/18: 5' 2\" (1.575 m). -   Weight as of 2/26/18: 243 lb 3.2 oz (110.3 kg). - General: pleasant, no distress, good eye contact,obese  - HEENT: no pallor, no periorbital edema, EOMI  - Neck: supple, no thyromegaly  - Cardiovascular: regular, normal rate, normal S1 and S2  - Respiratory: clear to auscultation bilaterally  - Gastrointestinal: soft, nontender, nondistended,  BS +  - Musculoskeletal:  no acanthosis nigricans  - Foot exam -:lymphedema   - Neurological: alert and oriented  - Psychiatric: normal mood and affect  - Skin: color, texture, turgor normal.     Diabetic foot exam: February 2018    Left:    Vibratory sensation absent    Filament test absent sensation with micro filament   Pulse DP: 1 +    Deformities: Erythema, lymphedema,    Right:    Vibratory sensation absent   Filament test absent sensation with micro filament   Pulse DP: 1+                     Deformities: Callus, erythema, lymphedema    Data Reviewed:     [] Glucose records reviewed. [] See glucose records for details (to be scanned).   [] A1C  [x] Reviewed labs    Creatinine normal, A1 C  Lab Results   Component Value Date/Time    Hemoglobin A1c 10.4 (H) 01/10/2017 09:29 AM    Hemoglobin A1c 10.9 (H) 01/20/2015 09:52 AM    Glucose 269 (H) 01/10/2017 09:29 AM    Glucose (POC) 158 (H) 03/18/2015 12:08 PM    Glucose  02/13/2018 03:04 PM    Microalb/Creat ratio (ug/mg creat.) 34.2 (H) 01/10/2017 09:29 AM    LDL, calculated 139 (H) 01/10/2017 09:29 AM    Creatinine 1.03 (H) 01/10/2017 09:29 AM    Hemoglobin A1c, External 9.3 12/02/2015    Hemoglobin A1c, External 10.6 05/07/2015 12:39 PM      Lab Results   Component Value Date/Time    Cholesterol, total 204 (H) 01/10/2017 09:29 AM    HDL Cholesterol 38 (L) 01/10/2017 09:29 AM    LDL, calculated 139 (H) 01/10/2017 09:29 AM    Triglyceride 133 01/10/2017 09:29 AM     Lab Results   Component Value Date/Time    ALT (SGPT) 15 01/10/2017 09:29 AM    AST (SGOT) 22 01/10/2017 09:29 AM    Alk. phosphatase 98 01/10/2017 09:29 AM    Bilirubin, total 0.4 01/10/2017 09:29 AM     Lab Results   Component Value Date/Time    GFR est AA 62 01/10/2017 09:29 AM    GFR est non-AA 54 (L) 01/10/2017 09:29 AM    Creatinine 1.03 (H) 01/10/2017 09:29 AM    BUN 16 01/10/2017 09:29 AM    Sodium 139 01/10/2017 09:29 AM    Potassium 5.7 (H) 01/10/2017 09:29 AM    Chloride 97 01/10/2017 09:29 AM    CO2 27 01/10/2017 09:29 AM      No results found for: TSH, TSHEXT     Assessment/Plan:     1. Type 2 diabetes mellitus with hyperglycemia, with long-term current use of insulin ([address]    2. Mixed hyperlipidemia    3. Essential hypertension        1. Type 2 Diabetes Mellitus with nephropathy,uncontrolled  Lab Results   Component Value Date/Time    Hemoglobin A1c 10.4 (H) 01/10/2017 09:29 AM    Hemoglobin A1c (POC) 11.1 02/13/2018 03:04 PM    Hemoglobin A1c, External 9.3 12/02/2015       poorly controlled diabetes due to noncompliance with the medication regimen. Improved Glucose,lost weight   Commended on weight loss  Levemir 30 units at night  Humalog or NovoLog 12 - 15 units before each meal  Discussed the plan with her sister-in-law, home health nurse as well as the patient  Low carbohydrate diet  She did not want to take mealtime insulin in the past but now agreeable        Advised to check glucose 4 times daily    2. HTN : Continue current therapy         3. Hyperlipidemia : Continue statin. 4.Obesity:There is no height or weight on file to calculate BMI.      5 PAD - PCI ,Lymphedema    6. Foot ulcer:  Followed by wound clinic        There are no Patient Instructions on file for this visit. Follow-up Disposition: Not on File    Thank you for allowing me to participate in the care of this patient. Jake Arteaga MD      Spent more than 40 minutes face-to-face with patient of which greater than 50% of the visit was spent in counseling, reviewed glucose log, and counseling regarding adjustment of insulin regimen.

## 2018-03-30 NOTE — TELEPHONE ENCOUNTER
----- Message from Amando Bridges sent at 3/30/2018 12:44 PM EDT -----  Regarding: Dr. Lima Pineda  The patient's sister in law is requesting a call back from the doctor or nurse to find out how much weight the patient has lost since her last visit.  [phone])[phone]

## 2018-03-30 NOTE — TELEPHONE ENCOUNTER
Informed Charbel Spain, on HIPAA, of weight difference. Pt verbalized understanding with no further questions or concerns at this time.

## 2018-03-30 NOTE — MR AVS SNAPSHOT
[address] [facility] [address] [facility] [address] 
[phone] Patient: Sherice Collins MRN: [id number]:[id number] Visit Information Date & Time Provider Department Dept. Phone Encounter #  
 3/30/2018  9:00 AM Sydni Benjamin MD [facility] [phone] [id number] Follow-up Instructions Return in about 4 months (around 7/30/2018). Your Appointments 6/13/2018  9:30 AM  
ROUTINE CARE with Sydni Benjamin MD  
[facility] [address] [facility]) Appt Note: F/U routine 3 months [address] [facility] [address] [facility] [address]  
[phone]  
  
   
 Graham Mcconnell [address] [address] Upcoming Health Maintenance Date Due  
 EYE EXAM RETINAL OR DILATED Q1 8/22/1952 DTaP/Tdap/Td series (1 - Tdap) 8/22/1963 ZOSTER VACCINE AGE 60> 6/22/2002 GLAUCOMA SCREENING Q2Y 8/22/2007 Bone Densitometry (Dexa) Screening 8/22/2007 Pneumococcal 65+ Low/Medium Risk (1 of 2 - PCV13) 8/22/2007 Influenza Age 5 to Adult 8/1/2017 MICROALBUMIN Q1 1/10/2018 LIPID PANEL Q1 1/10/2018 MEDICARE YEARLY EXAM 3/14/2018 HEMOGLOBIN A1C Q6M 8/13/2018 FOOT EXAM Q1 2/13/2019 Allergies as of 3/30/2018  Review Complete On: 3/30/2018 By: Christofer Donnelly LPN Severity Noted Reaction Type Reactions Lortab [Hydrocodone-acetaminophen] High 09/17/2014    Other (comments)  
 hallucinations Phenergan [Promethazine] High 01/27/2015    Other (comments) Hallucination Cephalexin  05/16/2016    Vertigo Clindamycin  03/30/2018    Vertigo Metolazone  02/26/2018    Other (comments) Pt gets \"paralyzed\" Oxycodone  09/17/2014    Other (comments)  
 hallucination Current Immunizations  Never Reviewed No immunizations on file. Not reviewed this visit You Were Diagnosed With   
  
 Codes Comments Type 2 diabetes mellitus with hyperglycemia, with long-term current use of insulin (HCC)    -  Primary ICD-10-CM: E11.65, Z79.4 ICD-9-CM: 250.00, 790.29, V58.67 Mixed hyperlipidemia     ICD-10-CM: E78.2 ICD-9-CM: 272.2 Essential hypertension     ICD-10-CM: I10 
ICD-9-CM: 401.9 Vitals BP Pulse Temp Resp Height(growth percentile) Weight(growth percentile) 106/56 (BP 1 Location: Left arm, BP Patient Position: Sitting) 76 95.5 °F (35.3 °C) (Oral) 16 5' 2\" (1.575 m) 247 lb 4.8 oz (112.2 kg) SpO2 BMI OB Status Smoking Status 96% 45.23 kg/m2 Menopause Never Smoker Vitals History BMI and BSA Data Body Mass Index Body Surface Area  
 45.23 kg/m 2 2.22 m 2 Preferred Pharmacy Pharmacy Name Phone [facility] [address] [phone] Your Updated Medication List  
  
   
This list is accurate as of 3/30/18  9:41 AM.  Always use your most recent med list.  
  
  
  
  
 albuterol 90 mcg/actuation inhaler Commonly known as:  PROVENTIL HFA, VENTOLIN HFA, PROAIR HFA Take  by inhalation. ammonium lactate 12 % topical cream  
Commonly known as:  AMLACTIN Apply  to affected area two (2) times a day. rub in to affected area well ASPERCREME 10 % lotion Generic drug:  trolamine salicylate Apply  to affected area. aspirin delayed-release 81 mg tablet Take 81 mg by mouth daily. atorvastatin 10 mg tablet Commonly known as:  LIPITOR Take  by mouth daily. * Blood-Glucose Meter monitoring kit Test blood glucose 3 times daily * Blood-Glucose Meter monitoring kit Commonly known as:  Alice Luna Test blood glucose 3 times daily  
  
 carvedilol 3.125 mg tablet Commonly known as:  Nancy Sagar Take  by mouth two (2) times daily (with meals). CENTRUM SILVER PO Take  by mouth.  
  
 cilostazol 50 mg tablet Commonly known as:  PLETAL  
 Take  by mouth Before breakfast and dinner. DIOVAN 80 mg tablet Generic drug:  valsartan Take 80 mg by mouth daily. furosemide 40 mg tablet Commonly known as:  LASIX Take 40-80 mg by mouth daily. glucose blood VI test strips strip Commonly known as:  ONETOUCH ULTRA TEST Test blood glucose 3 times daily Dx Code: E11.65  
  
 insulin aspart U-100 100 unit/mL Inpn Commonly known as:  Ida Newer 30 units before each meal with SSI ,Max daily units 120  
  
 insulin detemir U-100 100 unit/mL (3 mL) Inpn Commonly known as:  LEVEMIR FLEXTOUCH U-100 INSULN INJECT 60 units BID  . Leif Jews Leif Jews THIS  IS  A  DOSE  CHANGE. .. Insulin Needles (Disposable) 31 gauge x 3/16\" Ndle Commonly known as:  BD INSULIN PEN NEEDLE UF MINI Use to inject Levemir twice daily. Dx code E11.65 Lancets Misc Test blood glucose 3 times daily  
  
 lisinopril 5 mg tablet Commonly known as:  Madonna Cliche Take 2.5 mg by mouth daily. meclizine 25 mg tablet Commonly known as:  ANTIVERT Take  by mouth three (3) times daily as needed. metFORMIN 1,000 mg tablet Commonly known as:  GLUCOPHAGE Take 1 Tab by mouth two (2) times daily (with meals). METOLAZONE PO Take  by mouth.  
  
 metoprolol succinate 25 mg XL tablet Commonly known as:  TOPROL-XL Take 25 mg by mouth daily  
  
 naproxen 250 mg tablet Commonly known as:  NAPROSYN Take 250 mg by mouth two (2) times daily as needed for Pain. nystatin powder Commonly known as:  MYCOSTATIN Apply  to affected area two (2) times a day. potassium chloride 20 mEq tablet Commonly known as:  K-DUR, KLOR-CON Take  by mouth two (2) times a day. pravastatin 40 mg tablet Commonly known as:  PRAVACHOL Take 40 mg by mouth nightly. traMADol 50 mg tablet Commonly known as:  ULTRAM  
Take 50 mg by mouth every six (6) hours as needed for Pain. ZOFRAN (AS HYDROCHLORIDE) 4 mg tablet Generic drug:  ondansetron hcl Take 4 mg by mouth every eight (8) hours as needed for Nausea. * Notice: This list has 2 medication(s) that are the same as other medications prescribed for you. Read the directions carefully, and ask your doctor or other care provider to review them with you. Follow-up Instructions Return in about 4 months (around 7/30/2018). Patient Instructions Continue Metformin Levemir 40 units at 7 PM   
 
Check sugars before meals Humalog or Novolog fast acting 12 units before breakfast and 12 units lunch and 12 dinner  each meal  For 2 weeks and if still sugars are high, then Novolog 15 units before meals No Novolog if sugars are less 70 Additional Humalog or Novolog for high sugars based on the scale Blood sugar Fast acting insulin Breakfast/Lunch/Dinner 150-200  Add 2 Units 201-250  Add 4 Units 251-300  Add 6 Units 301-350  Add 8  Units 351-400  Add 10 Units Bring medicine bottles to your visit Introducing [facility]! Barbara Thomas introduces [employer] patient portal. Now you can access parts of your medical record, email your doctor's office, and request medication refills online. 1. In your internet browser, go to [email or web address] 2. Click on the First Time User? Click Here link in the Sign In box. You will see the New Member Sign Up page. 3. Enter your [employer] Access Code exactly as it appears below. You will not need to use this code after youve completed the sign-up process. If you do not sign up before the expiration date, you must request a new code. · [employer] Access Code: [email or web address] Expires: 5/14/2018  4:36 PM 
 
4. Enter the last four digits of your Social Security Number (xxxx) and Date of Birth (mm/dd/yyyy) as indicated and click Submit. You will be taken to the next sign-up page. 5. Create a [employer] ID. This will be your [employer] login ID and cannot be changed, so think of one that is secure and easy to remember. 6. Create a [employer] password. You can change your password at any time. 7. Enter your Password Reset Question and Answer. This can be used at a later time if you forget your password. 8. Enter your e-mail address. You will receive e-mail notification when new information is available in [address] 9. Click Sign Up. You can now view and download portions of your medical record. 10. Click the Download Summary menu link to download a portable copy of your medical information. If you have questions, please visit the Frequently Asked Questions section of the [employer] website. Remember, [employer] is NOT to be used for urgent needs. For medical emergencies, dial 911. Now available from your iPhone and Android! Please provide this summary of care documentation to your next provider. Your primary care clinician is listed as Candi Botello. If you have any questions after today's visit, please call [phone].

## 2018-03-30 NOTE — PROGRESS NOTES
Dalila Heck is a 78 y.o. female here for   Chief Complaint   Patient presents with    Diabetes    Cholesterol Problem    Hypertension       Functional glucose monitor and record keeping system? - yes  Eye exam within last year? - no  Foot exam within last year? - on file    1. Have you been to the ER, urgent care clinic since your last visit? Hospitalized since your last visit? -no    2. Have you seen or consulted any other health care providers outside of the [address] [place] [address] since your last visit? Include any pap smears or colon screening. -PCP      Lab Results   Component Value Date/Time    Hemoglobin A1c 10.4 (H) 01/10/2017 09:29 AM    Hemoglobin A1c (POC) 11.1 02/13/2018 03:04 PM    Hemoglobin A1c, External 9.3 12/02/2015       Wt Readings from Last 3 Encounters:   02/26/18 243 lb 3.2 oz (110.3 kg)   02/13/18 258 lb 3.2 oz (117.1 kg)   11/30/17 256 lb 12.8 oz (116.5 kg)     Temp Readings from Last 3 Encounters:   02/26/18 95.3 °F (35.2 °C) (Oral)   02/13/18 95.9 °F (35.5 °C) (Oral)   11/30/17 96.5 °F (35.8 °C) (Oral)     BP Readings from Last 3 Encounters:   02/26/18 99/43   02/13/18 153/62   11/30/17 137/62     Pulse Readings from Last 3 Encounters:   02/26/18 80   02/13/18 85   11/30/17 68

## 2018-06-13 NOTE — PROGRESS NOTES
Noris Storm is a 78 y.o. female here for   Chief Complaint   Patient presents with    Diabetes       Functional glucose monitor and record keeping system? - yes  Eye exam within last year? - not within last year  Foot exam within last year? - on file    1. Have you been to the ER, urgent care clinic since your last visit? Hospitalized since your last visit? - May 2018 for foot     2. Have you seen or consulted any other health care providers outside of the [address] [place] [address] since your last visit?   Include any pap smears or colon screening.- PCP, infectious disease MD

## 2018-06-13 NOTE — PATIENT INSTRUCTIONS
Continue Metformin      Levemir 50 units at 7 PM      Check sugars before meals     Humalog or Novolog fast acting 20 units before breakfast and 20 units lunch and 20 dinner  each meal  For 2 weeks and if still sugars are high, then Novolog 15 units before meals   No Novolog if sugars are less 70     Additional Humalog or Novolog for high sugars based on the scale    Blood sugar Fast acting insulin Breakfast/Lunch/Dinner     150-200  Add 2 Units       201-250  Add 4 Units       251-300  Add 6 Units       301-350  Add 8  Units        351-400  Add 10 Units           Bring medicine bottles to your visit

## 2018-06-13 NOTE — MR AVS SNAPSHOT
[address] [facility] [id number] 
[phone] Patient: Velasquez De Souza MRN: [id number] PWC:8/22/1113 Visit Information Date & Time Provider Department Dept. Phone Encounter #  
 6/13/2018 10:00 AM Cindy Coleman MD [facility] Diabetes & Endocrinology [phone] [id number] Follow-up Instructions Return in about 3 months (around 9/13/2018). Upcoming Health Maintenance Date Due  
 EYE EXAM RETINAL OR DILATED Q1 8/22/1952 DTaP/Tdap/Td series (1 - Tdap) 8/22/1963 ZOSTER VACCINE AGE 60> 6/22/2002 GLAUCOMA SCREENING Q2Y 8/22/2007 Bone Densitometry (Dexa) Screening 8/22/2007 Pneumococcal 65+ Low/Medium Risk (1 of 2 - PCV13) 8/22/2007 MICROALBUMIN Q1 1/10/2018 LIPID PANEL Q1 1/10/2018 MEDICARE YEARLY EXAM 3/14/2018 Influenza Age 5 to Adult 8/1/2018 HEMOGLOBIN A1C Q6M 8/13/2018 FOOT EXAM Q1 2/13/2019 Allergies as of 6/13/2018  Review Complete On: 6/13/2018 By: Cindy Coleman MD  
  
 Severity Noted Reaction Type Reactions Lortab [Hydrocodone-acetaminophen] High 09/17/2014    Other (comments)  
 hallucinations Phenergan [Promethazine] High 01/27/2015    Other (comments) Hallucination Cephalexin  05/16/2016    Vertigo Clindamycin  03/30/2018    Vertigo Metolazone  02/26/2018    Other (comments) Pt gets \"paralyzed\" Oxycodone  09/17/2014    Other (comments)  
 hallucination Current Immunizations  Never Reviewed No immunizations on file. Not reviewed this visit You Were Diagnosed With   
  
 Codes Comments Type 2 diabetes mellitus with hyperglycemia, with long-term current use of insulin (HCC)    -  Primary ICD-10-CM: E11.65, Z79.4 ICD-9-CM: 250.00, 790.29, V58.67 Vitals BP Pulse Temp Resp Height(growth percentile) SpO2  
 134/60 (BP 1 Location: Left arm, BP Patient Position: Sitting) 82 95.9 °F (35.5 °C) (Oral) 14 5' 2\" (1.575 m) 95% OB Status Smoking Status Menopause Never Smoker Vitals History Preferred Pharmacy Pharmacy Name Phone [facility] [place] [facility] [phone] Your Updated Medication List  
  
   
This list is accurate as of 6/13/18 10:04 AM.  Always use your most recent med list.  
  
  
  
  
 albuterol 90 mcg/actuation inhaler Commonly known as:  PROVENTIL HFA, VENTOLIN HFA, PROAIR HFA Take  by inhalation. ammonium lactate 12 % topical cream  
Commonly known as:  AMLACTIN Apply  to affected area two (2) times a day. rub in to affected area well ASPERCREME 10 % lotion Generic drug:  trolamine salicylate Apply  to affected area. aspirin delayed-release 81 mg tablet Take 81 mg by mouth daily. atorvastatin 10 mg tablet Commonly known as:  LIPITOR Take  by mouth daily. BACTRIM -800 mg per tablet Generic drug:  trimethoprim-sulfamethoxazole Take 1 Tab by mouth two (2) times a day. * Blood-Glucose Meter monitoring kit Test blood glucose 3 times daily * Blood-Glucose Meter monitoring kit Commonly known as:  Susana Baker Test blood glucose 3 times daily  
  
 carvedilol 3.125 mg tablet Commonly known as:  Ruth Ann Bhargavi Take  by mouth two (2) times daily (with meals). CENTRUM SILVER PO Take  by mouth.  
  
 cilostazol 50 mg tablet Commonly known as:  PLETAL Take  by mouth Before breakfast and dinner. DIOVAN 80 mg tablet Generic drug:  valsartan Take 80 mg by mouth daily. furosemide 40 mg tablet Commonly known as:  LASIX Take 40-80 mg by mouth daily. glucose blood VI test strips strip Commonly known as:  ONETOUCH ULTRA TEST Test blood glucose 3 times daily Dx Code: E11.65  
  
 insulin aspart U-100 100 unit/mL Inpn Commonly known as:  Jennifer Morin Inject 12 units before breakfast, lunch, and dinner. Plus sliding scale. Max daily dose 66 units. insulin detemir U-100 100 unit/mL (3 mL) Inpn Commonly known as:  LEVEMIR FLEXTOUCH U-100 INSULN Inject 40 units at 7 pm.  
  
 Insulin Needles (Disposable) 31 gauge x 3/16\" Ndle Commonly known as:  BD ULTRA-FINE MINI PEN NEEDLE Use to inject Levemir twice daily. Dx code E11.65 Lancets Misc Test blood glucose 3 times daily  
  
 lisinopril 5 mg tablet Commonly known as:  Tildon Cristela Take 2.5 mg by mouth daily. meclizine 25 mg tablet Commonly known as:  ANTIVERT Take  by mouth three (3) times daily as needed. metFORMIN 1,000 mg tablet Commonly known as:  GLUCOPHAGE Take 1 Tab by mouth two (2) times daily (with meals). METOLAZONE PO Take  by mouth.  
  
 metoprolol succinate 25 mg XL tablet Commonly known as:  TOPROL-XL Take 25 mg by mouth daily  
  
 naproxen 250 mg tablet Commonly known as:  NAPROSYN Take 250 mg by mouth two (2) times daily as needed for Pain. nystatin powder Commonly known as:  MYCOSTATIN Apply  to affected area two (2) times a day. potassium chloride 20 mEq tablet Commonly known as:  K-DUR, KLOR-CON Take  by mouth two (2) times a day. pravastatin 40 mg tablet Commonly known as:  PRAVACHOL Take 40 mg by mouth nightly. traMADol 50 mg tablet Commonly known as:  ULTRAM  
Take 50 mg by mouth every six (6) hours as needed for Pain. ZOFRAN 4 mg tablet Generic drug:  ondansetron hcl Take 4 mg by mouth every eight (8) hours as needed for Nausea. * Notice: This list has 2 medication(s) that are the same as other medications prescribed for you. Read the directions carefully, and ask your doctor or other care provider to review them with you. Follow-up Instructions Return in about 3 months (around 9/13/2018). Patient Instructions Continue Metformin Levemir 50 units at 7 PM   
 
Check sugars before meals Humalog or Novolog fast acting 20 units before breakfast and 20 units lunch and 20 dinner  each meal  For 2 weeks and if still sugars are high, then Novolog 15 units before meals No Novolog if sugars are less 70 Additional Humalog or Novolog for high sugars based on the scale Blood sugar Fast acting insulin Breakfast/Lunch/Dinner 150-200  Add 2 Units 201-250  Add 4 Units 251-300  Add 6 Units 301-350  Add 8  Units 351-400  Add 10 Units Bring medicine bottles to your visit Introducing [facility]! [facility] introduces [employer] patient portal. Now you can access parts of your medical record, email your doctor's office, and request medication refills online. 1. In your internet browser, go to [email or web address] 2. Click on the First Time User? Click Here link in the Sign In box. You will see the New Member Sign Up page. 3. Enter your [employer] Access Code exactly as it appears below. You will not need to use this code after youve completed the sign-up process. If you do not sign up before the expiration date, you must request a new code. · [employer] Access Code: [email or web address] Expires: 9/11/2018 10:04 AM 
 
4. Enter the last four digits of your Social Security Number (xxxx) and Date of Birth (mm/dd/yyyy) as indicated and click Submit. You will be taken to the next sign-up page. 5. Create a [employer] ID. This will be your [employer] login ID and cannot be changed, so think of one that is secure and easy to remember. 6. Create a [employer] password. You can change your password at any time. 7. Enter your Password Reset Question and Answer. This can be used at a later time if you forget your password. 8. Enter your e-mail address. You will receive e-mail notification when new information is available in [address] 9. Click Sign Up. You can now view and download portions of your medical record. 10. Click the Download Summary menu link to download a portable copy of your medical information. If you have questions, please visit the Frequently Asked Questions section of the [employer] website. Remember, [employer] is NOT to be used for urgent needs. For medical emergencies, dial 911. Now available from your iPhone and Android! Please provide this summary of care documentation to your next provider. Your primary care clinician is listed as Katie Jasso. If you have any questions after today's visit, please call [phone].

## 2018-06-13 NOTE — PROGRESS NOTES
Simone Culp MD          Patient Information  Date:6/13/2018  Name : Marily Walterr 78 y.o.     YOB: 1940         Referred by: Simba Frank NP         Chief Complaint   Patient presents with    Diabetes       History of Present Illness: Marily Officer is a 78 y.o. female here for follow up of  Type 2 Diabetes Mellitus. type 2 diabetes, long-standing history of uncontrolled diabetes, complicated by peripheral neuropathy, foot ulcer  She is taking insulin as family is helping   Levemir 40 units , Novolog 12 + SSI   She has osteomyelitis of the right foot, could not tolerate doxycycline and now on Bactrim. Since starting antibiotics blood glucose has increased to 200-300  Prior to that it was all less than 150. No fever. Not able to tolerate Bactrim  A1c was 7.7 last month which is a big improvement for her      February 26, 2018    She was hospitalized  for difficulty breathing, severe hyperglycemia, sister-in-law with the patient reported that she was not taking the insulin consistently. During the hospitalization the insulin has been decreased to 20 units of Levemir  Blood glucoses in 200s  She has home health now  Diet healthy  She had diabetic foot ulcer infection, was on IV antibiotics    Prior history February 2018   She had nonspecific side effects to multiple medications, Humalog was discontinued in the past as she c/o cramping in the legs and Trulicity was started. She is not taking Trulicity now as she ran out of the prescription. She also self stopped Levemir as it did not work. She is taking Humalog 20 units now before each meal, she has Humalog pens with her which was initially prescribed in September 2016. In the past she refused to take Humalog or even NovoLog  She is forgetting insulin.   She has checked her blood glucose and they are ranging from 300-400  Has foot pain, complains of redness, swelling    Prior history  Self changes insulin, follow-up with me is very poor, she was seen in January 2017 and November 2017  The regimen changes every time she follows up with me    She has preulcerative callus      In the past, she had mentioned being unable to afford a healthy diet and hence on a high carb diet. She has history of lymphedema and was followed by lymphedema clinic in the past    No regular exercise. Wt Readings from Last 3 Encounters:   03/30/18 247 lb 4.8 oz (112.2 kg)   02/26/18 243 lb 3.2 oz (110.3 kg)   02/13/18 258 lb 3.2 oz (117.1 kg)       BP Readings from Last 3 Encounters:   06/13/18 134/60   03/30/18 106/56   02/26/18 99/43           Past Medical History:   Diagnosis Date    HTN (hypertension)     Hyperlipidemia     Hypothyroid     Lymphedema     Type II or unspecified type diabetes mellitus with neurological manifestations, uncontrolled(250.62) (HCC)      Current Outpatient Prescriptions   Medication Sig    trimethoprim-sulfamethoxazole (BACTRIM DS) 160-800 mg per tablet Take 1 Tab by mouth two (2) times a day.  ondansetron hcl (ZOFRAN, AS HYDROCHLORIDE,) 4 mg tablet Take 4 mg by mouth every eight (8) hours as needed for Nausea.  insulin aspart U-100 (NOVOLOG) 100 unit/mL inpn Inject 12 units before breakfast, lunch, and dinner. Plus sliding scale. Max daily dose 66 units.  insulin detemir U-100 (LEVEMIR FLEXTOUCH U-100 INSULN) 100 unit/mL (3 mL) inpn Inject 40 units at 7 pm.    potassium chloride (K-DUR, KLOR-CON) 20 mEq tablet Take  by mouth two (2) times a day.  carvedilol (COREG) 3.125 mg tablet Take  by mouth two (2) times daily (with meals).  atorvastatin (LIPITOR) 10 mg tablet Take  by mouth daily.  cilostazol (PLETAL) 50 mg tablet Take  by mouth Before breakfast and dinner.  lisinopril (PRINIVIL, ZESTRIL) 5 mg tablet Take 2.5 mg by mouth daily.  nystatin (MYCOSTATIN) powder Apply  to affected area two (2) times a day.     albuterol (PROVENTIL HFA, VENTOLIN HFA, PROAIR HFA) 90 mcg/actuation inhaler Take  by inhalation.  ammonium lactate (AMLACTIN) 12 % topical cream Apply  to affected area two (2) times a day. rub in to affected area well    metFORMIN (GLUCOPHAGE) 1,000 mg tablet Take 1 Tab by mouth two (2) times daily (with meals).  Insulin Needles, Disposable, (BD INSULIN PEN NEEDLE UF MINI) 31 gauge x 3/16\" ndle Use to inject Levemir twice daily. Dx code E11.65    glucose blood VI test strips (ONETOUCH ULTRA TEST) strip Test blood glucose 3 times daily Dx Code: E11.65    Blood-Glucose Meter (ONE TOUCH ULTRAMINI) monitoring kit Test blood glucose 3 times daily    Blood-Glucose Meter monitoring kit Test blood glucose 3 times daily    Lancets misc Test blood glucose 3 times daily    furosemide (LASIX) 40 mg tablet Take 40-80 mg by mouth daily.  aspirin delayed-release 81 mg tablet Take 81 mg by mouth daily.  METOLAZONE PO Take  by mouth.  metoprolol succinate (TOPROL-XL) 25 mg XL tablet Take 25 mg by mouth daily    traMADol (ULTRAM) 50 mg tablet Take 50 mg by mouth every six (6) hours as needed for Pain.  meclizine (ANTIVERT) 25 mg tablet Take  by mouth three (3) times daily as needed.  trolamine salicylate (ASPERCREME) 10 % lotion Apply  to affected area.  FOLIC ACID/MULTIVIT-MIN/LUTEIN (CENTRUM SILVER PO) Take  by mouth.  naproxen (NAPROSYN) 250 mg tablet Take 250 mg by mouth two (2) times daily as needed for Pain.  valsartan (DIOVAN) 80 mg tablet Take 80 mg by mouth daily.  pravastatin (PRAVACHOL) 40 mg tablet Take 40 mg by mouth nightly. No current facility-administered medications for this visit.       Allergies   Allergen Reactions    Lortab [Hydrocodone-Acetaminophen] Other (comments)     hallucinations    Phenergan [Promethazine] Other (comments)     Hallucination    Cephalexin Vertigo    Clindamycin Vertigo    Metolazone Other (comments)     Pt gets \"paralyzed\"    Oxycodone Other (comments) hallucination         Review of Systems:  - Constitutional Symptoms: no fevers, no chills  - Eyes: no blurry vision no double vision  - Cardiovascular: no chest pain ,no palpitations  - Respiratory: no cough no shortness of breath  - Gastrointestinal: no dysphagia no  abdominal pain  - Musculoskeletal: no joint pains +  weakness  - Integumentary: no rashes  - Neurological: + numbness, tingling, no  headaches  -     Physical Examination:   Blood pressure 134/60, pulse 82, temperature 95.9 °F (35.5 °C), temperature source Oral, resp. rate 14, height 5' 2\" (1.575 m), SpO2 95 %. Estimated body mass index is 45.23 kg/(m^2) as calculated from the following:    Height as of 3/30/18: 5' 2\" (1.575 m). -   Weight as of 3/30/18: 247 lb 4.8 oz (112.2 kg). - General: pleasant, no distress, good eye contact,obese  - HEENT: no pallor, no periorbital edema, EOMI  - Neck: supple, no thyromegaly  - Cardiovascular: regular, normal rate, normal S1 and S2  - Respiratory: clear to auscultation bilaterally  - Gastrointestinal: soft, nontender, nondistended,  BS +  - Musculoskeletal:  no acanthosis nigricans  - Foot exam -:lymphedema   - Neurological: alert and oriented  - Psychiatric: normal mood and affect  - Skin: color, texture, turgor normal.     Diabetic foot exam: February 2018    Left:    Vibratory sensation absent    Filament test absent sensation with micro filament   Pulse DP: 1 +    Deformities: Erythema, lymphedema,    Right:    Vibratory sensation absent   Filament test absent sensation with micro filament   Pulse DP: 1+                     Deformities: Callus, erythema, lymphedema    Data Reviewed:     [] Glucose records reviewed. [] See glucose records for details (to be scanned).   [] A1C  [x] Reviewed labs    Creatinine normal, A1 C  Lab Results   Component Value Date/Time    Hemoglobin A1c 10.4 (H) 01/10/2017 09:29 AM    Hemoglobin A1c 10.9 (H) 01/20/2015 09:52 AM    Glucose 269 (H) 01/10/2017 09:29 AM    Glucose (POC) 158 (H) 03/18/2015 12:08 PM    Glucose  02/13/2018 03:04 PM    Microalb/Creat ratio (ug/mg creat.) 34.2 (H) 01/10/2017 09:29 AM    LDL, calculated 139 (H) 01/10/2017 09:29 AM    Creatinine 1.03 (H) 01/10/2017 09:29 AM    Hemoglobin A1c, External 9.3 12/02/2015    Hemoglobin A1c, External 10.6 05/07/2015 12:39 PM      Lab Results   Component Value Date/Time    Cholesterol, total 204 (H) 01/10/2017 09:29 AM    HDL Cholesterol 38 (L) 01/10/2017 09:29 AM    LDL, calculated 139 (H) 01/10/2017 09:29 AM    Triglyceride 133 01/10/2017 09:29 AM     Lab Results   Component Value Date/Time    ALT (SGPT) 15 01/10/2017 09:29 AM    AST (SGOT) 22 01/10/2017 09:29 AM    Alk. phosphatase 98 01/10/2017 09:29 AM    Bilirubin, total 0.4 01/10/2017 09:29 AM     Lab Results   Component Value Date/Time    GFR est AA 62 01/10/2017 09:29 AM    GFR est non-AA 54 (L) 01/10/2017 09:29 AM    Creatinine 1.03 (H) 01/10/2017 09:29 AM    BUN 16 01/10/2017 09:29 AM    Sodium 139 01/10/2017 09:29 AM    Potassium 5.7 (H) 01/10/2017 09:29 AM    Chloride 97 01/10/2017 09:29 AM    CO2 27 01/10/2017 09:29 AM      No results found for: TSH, TSHEXT     Assessment/Plan:     No diagnosis found. 1. Type 2 Diabetes Mellitus with nephropathy,uncontrolled  Lab Results   Component Value Date/Time    Hemoglobin A1c 10.4 (H) 01/10/2017 09:29 AM    Hemoglobin A1c (POC) 11.1 02/13/2018 03:04 PM    Hemoglobin A1c, External 9.3 12/02/2015     A1c 7.7 May 2018  Improvement in the glycemic control is due to change in the diet as well as taking the insulin consistently. I suspected that she was not taking the insulin as recommended in the past but could not prove it. Adjusted the insulin during infection. Once infection resolves the dose needs to be reduced.   Levemir 50 units at night  Humalog or NovoLog 20 units before each meal  Discussed the plan with her sister-in-law, home health nurse as well as the patient  Low carbohydrate diet        Advised to check glucose 4 times daily    2. HTN : Continue current therapy         3. Hyperlipidemia : Continue statin. 4.Obesity:There is no height or weight on file to calculate BMI.      5 PAD - PCI ,Lymphedema    6. Foot ulcer: Followed by wound clinic        There are no Patient Instructions on file for this visit. Follow-up Disposition: Not on File    Thank you for allowing me to participate in the care of this patient.     Isaiah Yee MD

## 2018-08-23 NOTE — PROGRESS NOTES
Britt Ghotra is a 70 y.o. female here for   Chief Complaint   Patient presents with    Diabetes       Functional glucose monitor and record keeping system? - yes  Eye exam within last year? - has appt on 8/30/18 [facility] / [address] [place] exam within last year? - on file    1. Have you been to the ER, urgent care clinic since your last visit? Hospitalized since your last visit? -no    2. Have you seen or consulted any other health care providers outside of the [address] [place] [address] since your last visit?   Include any pap smears or colon screening.-no

## 2018-08-23 NOTE — MR AVS SNAPSHOT
[address] [facility] [address] 
[phone] Patient: Marbella Simon MRN: [id number] PDB:[id number]/79/[id number] Visit Information Date & Time Provider Department Dept. Phone Encounter #  
 8/23/2018  2:45 PM Dante Kent MD [facility] [phone] [id number] Follow-up Instructions Return in about 4 months (around 12/23/2018). Upcoming Health Maintenance Date Due  
 EYE EXAM RETINAL OR DILATED Q1 8/22/1952 DTaP/Tdap/Td series (1 - Tdap) 8/22/1963 ZOSTER VACCINE AGE 60> 6/22/2002 GLAUCOMA SCREENING Q2Y 8/22/2007 Bone Densitometry (Dexa) Screening 8/22/2007 Pneumococcal 65+ Low/Medium Risk (1 of 2 - PCV13) 8/22/2007 MICROALBUMIN Q1 1/10/2018 LIPID PANEL Q1 1/10/2018 MEDICARE YEARLY EXAM 3/14/2018 Influenza Age 5 to Adult 8/1/2018 HEMOGLOBIN A1C Q6M 8/13/2018 FOOT EXAM Q1 2/13/2019 Allergies as of 8/23/2018  Review Complete On: 8/23/2018 By: Dante Kent MD  
  
 Severity Noted Reaction Type Reactions Lortab [Hydrocodone-acetaminophen] High 09/17/2014    Other (comments)  
 hallucinations Phenergan [Promethazine] High 01/27/2015    Other (comments) Hallucination Cephalexin  05/16/2016    Vertigo Clindamycin  03/30/2018    Vertigo Metolazone  02/26/2018    Other (comments) Pt gets \"paralyzed\" Oxycodone  09/17/2014    Other (comments)  
 hallucination Current Immunizations  Never Reviewed No immunizations on file. Not reviewed this visit You Were Diagnosed With   
  
 Codes Comments Type 2 diabetes mellitus with hyperglycemia, with long-term current use of insulin (HCC)    -  Primary ICD-10-CM: E11.65, Z79.4 ICD-9-CM: 250.00, 790.29, V58.67 Mixed hyperlipidemia     ICD-10-CM: E78.2 ICD-9-CM: 272.2 Hypothyroidism due to acquired atrophy of thyroid     ICD-10-CM: E03.4 ICD-9-CM: 244.8, 246.8 Essential hypertension     ICD-10-CM: I10 
ICD-9-CM: 401.9 Vitals BP Pulse Temp Resp Height(growth percentile) Weight(growth percentile) 123/50 (BP 1 Location: Left arm, BP Patient Position: Sitting) 77 96.2 °F (35.7 °C) (Oral) 16 5' 2\" (1.575 m) 249 lb 14.4 oz (113.4 kg) BMI OB Status Smoking Status 45.71 kg/m2 Menopause Never Smoker Vitals History BMI and BSA Data Body Mass Index Body Surface Area 45.71 kg/m 2 2.23 m 2 Preferred Pharmacy Pharmacy Name Phone [facility], [address] [place] [address] [address] [phone] Your Updated Medication List  
  
   
This list is accurate as of 8/23/18  3:22 PM.  Always use your most recent med list.  
  
  
  
  
 albuterol 90 mcg/actuation inhaler Commonly known as:  PROVENTIL HFA, VENTOLIN HFA, PROAIR HFA Take  by inhalation. ammonium lactate 12 % topical cream  
Commonly known as:  AMLACTIN Apply  to affected area as needed. rub in to affected area well ASPERCREME 10 % lotion Generic drug:  trolamine salicylate Apply  to affected area. aspirin delayed-release 81 mg tablet Take 81 mg by mouth daily. atorvastatin 10 mg tablet Commonly known as:  LIPITOR Take  by mouth daily. * Blood-Glucose Meter monitoring kit Test blood glucose 3 times daily * Blood-Glucose Meter monitoring kit Commonly known as:  Ceola Penta Test blood glucose 3 times daily  
  
 carvedilol 3.125 mg tablet Commonly known as:  Jestine Pellet Take  by mouth two (2) times daily (with meals). CENTRUM SILVER PO Take  by mouth.  
  
 cilostazol 50 mg tablet Commonly known as:  PLETAL Take  by mouth Before breakfast and dinner. DIOVAN 80 mg tablet Generic drug:  valsartan Take 80 mg by mouth daily. furosemide 40 mg tablet Commonly known as:  LASIX Take 40-80 mg by mouth daily. glucose blood VI test strips strip Commonly known as:  ONETOUCH ULTRA TEST Test blood glucose 3 times daily Dx Code: E11.65  
  
 insulin aspart U-100 100 unit/mL Inpn Commonly known as:  Alejandro Solis Inject 12 units before breakfast, lunch, and dinner. Plus sliding scale. Max daily dose 66 units. insulin detemir U-100 100 unit/mL (3 mL) Inpn Commonly known as:  LEVEMIR FLEXTOUCH U-100 INSULN Inject 50 units at 7 pm.  
  
 Insulin Needles (Disposable) 31 gauge x 3/16\" Ndle Commonly known as:  BD ULTRA-FINE MINI PEN NEEDLE Use to inject Levemir twice daily. Dx code E11.65 Lancets Misc Test blood glucose 3 times daily  
  
 lisinopril 5 mg tablet Commonly known as:  Ro Yost Take 2.5 mg by mouth daily. meclizine 25 mg tablet Commonly known as:  ANTIVERT Take  by mouth three (3) times daily as needed. metFORMIN 1,000 mg tablet Commonly known as:  GLUCOPHAGE Take 1 Tab by mouth two (2) times daily (with meals). METOLAZONE PO Take  by mouth.  
  
 metoprolol succinate 25 mg XL tablet Commonly known as:  TOPROL-XL Take 25 mg by mouth daily  
  
 naproxen 250 mg tablet Commonly known as:  NAPROSYN Take 250 mg by mouth two (2) times daily as needed for Pain. nystatin powder Commonly known as:  MYCOSTATIN Apply  to affected area two (2) times a day. potassium chloride 20 mEq tablet Commonly known as:  K-DUR, KLOR-CON Take  by mouth two (2) times a day. pravastatin 40 mg tablet Commonly known as:  PRAVACHOL Take 40 mg by mouth nightly. traMADol 50 mg tablet Commonly known as:  ULTRAM  
Take 50 mg by mouth every six (6) hours as needed for Pain. ZOFRAN 4 mg tablet Generic drug:  ondansetron hcl Take 4 mg by mouth every eight (8) hours as needed for Nausea. * Notice: This list has 2 medication(s) that are the same as other medications prescribed for you.  Read the directions carefully, and ask your doctor or other care provider to review them with you. We Performed the Following AMB POC HEMOGLOBIN A1C [39681 CPT(R)] Follow-up Instructions Return in about 4 months (around 12/23/2018). Patient Instructions Continue Metformin Levemir 50 units at 7 PM   
 
Check sugars before meals Humalog or Novolog fast acting 15 units before breakfast ,15  units before lunch and 15 units before dinner No Novolog if sugars are less 70 Additional Humalog or Novolog for high sugars based on the scale Blood sugar Fast acting insulin Breakfast/Lunch/Dinner 150-200  Add 2 Units 201-250  Add 4 Units 251-300  Add 6 Units 301-350  Add 8  Units 351-400  Add 10 Units Bring medicine bottles to your visit Introducing [facility]! Eliz Grady introduces GetYourGuide patient portal. Now you can access parts of your medical record, email your doctor's office, and request medication refills online. 1. In your internet browser, go to [email or web address] 2. Click on the First Time User? Click Here link in the Sign In box. You will see the New Member Sign Up page. 3. Enter your GetYourGuide Access Code exactly as it appears below. You will not need to use this code after youve completed the sign-up process. If you do not sign up before the expiration date, you must request a new code. · GetYourGuide Access Code: [email or web address] Expires: 9/11/2018 10:04 AM 
 
4. Enter the last four digits of your Social Security Number (xxxx) and Date of Birth (mm/dd/yyyy) as indicated and click Submit. You will be taken to the next sign-up page. 5. Create a GetYourGuide ID. This will be your GetYourGuide login ID and cannot be changed, so think of one that is secure and easy to remember. 6. Create a GetYourGuide password. You can change your password at any time. 7. Enter your Password Reset Question and Answer.  This can be used at a later time if you forget your password. 8. Enter your e-mail address. You will receive e-mail notification when new information is available in [address] 9. Click Sign Up. You can now view and download portions of your medical record. 10. Click the Download Summary menu link to download a portable copy of your medical information. If you have questions, please visit the Frequently Asked Questions section of the [employer] website. Remember, [employer]commo is NOT to be used for urgent needs. For medical emergencies, dial 911. Now available from your iPhone and Android! Please provide this summary of care documentation to your next provider. Your primary care clinician is listed as Mattie Curling. If you have any questions after today's visit, please call [phone].

## 2018-08-23 NOTE — PATIENT INSTRUCTIONS
Continue Metformin      Levemir 50 units at 7 PM      Check sugars before meals     Humalog or Novolog fast acting 15 units before breakfast ,15  units before lunch and 15 units before dinner    No Novolog if sugars are less 70     Additional Humalog or Novolog for high sugars based on the scale    Blood sugar Fast acting insulin Breakfast/Lunch/Dinner     150-200  Add 2 Units       201-250  Add 4 Units       251-300  Add 6 Units       301-350  Add 8  Units        351-400  Add 10 Units           Bring medicine bottles to your visit

## 2018-08-26 PROBLEM — Z79.4 TYPE 2 DIABETES MELLITUS WITH DIABETIC POLYNEUROPATHY, WITH LONG-TERM CURRENT USE OF INSULIN (HCC): Status: ACTIVE | Noted: 2018-01-01

## 2018-08-26 PROBLEM — E11.42 TYPE 2 DIABETES MELLITUS WITH DIABETIC POLYNEUROPATHY, WITH LONG-TERM CURRENT USE OF INSULIN (HCC): Status: ACTIVE | Noted: 2018-01-01

## 2018-08-26 NOTE — PROGRESS NOTES
Miracle Coronel MD          Patient Information  Date:8/26/2018  Name : Romeo Espitia 70 y.o.     YOB: 1940         Referred by: Leland Webb NP         Chief Complaint   Patient presents with    Diabetes       History of Present Illness: Romeo Espitia is a 70 y.o. female here for follow up of  Type 2 Diabetes Mellitus. type 2 diabetes, long-standing history of uncontrolled diabetes, complicated by peripheral neuropathy, foot ulcer  She is on Levemir and prandial insulin  No hypoglycemia. Blood glucose still fluctuating, when  she is on a high-protein diet stabilizes, has severe insulin resistance  Limited activity due to comorbidities        February 26, 2018    She was hospitalized  for difficulty breathing, severe hyperglycemia, sister-in-law with the patient reported that she was not taking the insulin consistently. During the hospitalization the insulin has been decreased to 20 units of Levemir  Blood glucoses in 200s  She has home health now  Diet healthy  She had diabetic foot ulcer infection, was on IV antibiotics    Prior history February 2018   She had nonspecific side effects to multiple medications, Humalog was discontinued in the past as she c/o cramping in the legs and Trulicity was started. She is not taking Trulicity now as she ran out of the prescription. She also self stopped Levemir as it did not work. She is taking Humalog 20 units now before each meal, she has Humalog pens with her which was initially prescribed in September 2016. In the past she refused to take Humalog or even NovoLog  She is forgetting insulin.   She has checked her blood glucose and they are ranging from 300-400  Has foot pain, complains of redness, swelling    Prior history  Self changes insulin, follow-up with me is very poor, she was seen in January 2017 and November 2017  The regimen changes every time she follows up with me    She has preulcerative callus      In the past, she had mentioned being unable to afford a healthy diet and hence on a high carb diet. She has history of lymphedema and was followed by lymphedema clinic in the past    No regular exercise. Wt Readings from Last 3 Encounters:   08/23/18 249 lb 14.4 oz (113.4 kg)   03/30/18 247 lb 4.8 oz (112.2 kg)   02/26/18 243 lb 3.2 oz (110.3 kg)       BP Readings from Last 3 Encounters:   08/23/18 123/50   06/13/18 134/60   03/30/18 106/56           Past Medical History:   Diagnosis Date    HTN (hypertension)     Hyperlipidemia     Hypothyroid     Lymphedema     Type II or unspecified type diabetes mellitus with neurological manifestations, uncontrolled(250.62) (HCC)      Current Outpatient Prescriptions   Medication Sig    ondansetron hcl (ZOFRAN, AS HYDROCHLORIDE,) 4 mg tablet Take 4 mg by mouth every eight (8) hours as needed for Nausea.  potassium chloride (K-DUR, KLOR-CON) 20 mEq tablet Take  by mouth two (2) times a day.  carvedilol (COREG) 3.125 mg tablet Take  by mouth two (2) times daily (with meals).  atorvastatin (LIPITOR) 10 mg tablet Take  by mouth daily.  cilostazol (PLETAL) 50 mg tablet Take  by mouth Before breakfast and dinner.  lisinopril (PRINIVIL, ZESTRIL) 5 mg tablet Take 2.5 mg by mouth daily.  metFORMIN (GLUCOPHAGE) 1,000 mg tablet Take 1 Tab by mouth two (2) times daily (with meals).  glucose blood VI test strips (ONETOUCH ULTRA TEST) strip Test blood glucose 3 times daily Dx Code: E11.65    Blood-Glucose Meter (ONE TOUCH ULTRAMINI) monitoring kit Test blood glucose 3 times daily    Blood-Glucose Meter monitoring kit Test blood glucose 3 times daily    Lancets misc Test blood glucose 3 times daily    furosemide (LASIX) 40 mg tablet Take 40-80 mg by mouth daily.  aspirin delayed-release 81 mg tablet Take 81 mg by mouth daily.     insulin detemir U-100 (LEVEMIR FLEXTOUCH U-100 INSULN) 100 unit/mL (3 mL) inpn Inject 50 units at 7 pm.    Insulin Needles, Disposable, (BD ULTRA-FINE MINI PEN NEEDLE) 31 gauge x 3/16\" ndle Use to inject Levemir and Novolog QID Dx code E11.65    insulin aspart U-100 (NOVOLOG) 100 unit/mL inpn Inject 15 units before breakfast ,15 units before lunch and 15 units before dinner  W/ SSI Max units daily: 75    nystatin (MYCOSTATIN) powder Apply  to affected area two (2) times a day.  albuterol (PROVENTIL HFA, VENTOLIN HFA, PROAIR HFA) 90 mcg/actuation inhaler Take  by inhalation.  ammonium lactate (AMLACTIN) 12 % topical cream Apply  to affected area as needed. rub in to affected area well     METOLAZONE PO Take  by mouth.  metoprolol succinate (TOPROL-XL) 25 mg XL tablet Take 25 mg by mouth daily    traMADol (ULTRAM) 50 mg tablet Take 50 mg by mouth every six (6) hours as needed for Pain.  meclizine (ANTIVERT) 25 mg tablet Take  by mouth three (3) times daily as needed.  trolamine salicylate (ASPERCREME) 10 % lotion Apply  to affected area.  FOLIC ACID/MULTIVIT-MIN/LUTEIN (CENTRUM SILVER PO) Take  by mouth.  naproxen (NAPROSYN) 250 mg tablet Take 250 mg by mouth two (2) times daily as needed for Pain.  valsartan (DIOVAN) 80 mg tablet Take 80 mg by mouth daily.  pravastatin (PRAVACHOL) 40 mg tablet Take 40 mg by mouth nightly. No current facility-administered medications for this visit.       Allergies   Allergen Reactions    Lortab [Hydrocodone-Acetaminophen] Other (comments)     hallucinations    Phenergan [Promethazine] Other (comments)     Hallucination    Cephalexin Vertigo    Clindamycin Vertigo    Metolazone Other (comments)     Pt gets \"paralyzed\"    Oxycodone Other (comments)     hallucination         Review of Systems:  - Constitutional Symptoms: no fevers, no chills  - Eyes: no blurry vision no double vision  - Cardiovascular: no chest pain ,no palpitations  - Respiratory: no cough no shortness of breath  - Gastrointestinal: no dysphagia no  abdominal pain  - Musculoskeletal: no joint pains +  weakness  - Integumentary: no rashes  - Neurological: + numbness, tingling, no  headaches  -     Physical Examination:   Blood pressure 123/50, pulse 77, temperature 96.2 °F (35.7 °C), temperature source Oral, resp. rate 16, height 5' 2\" (1.575 m), weight 249 lb 14.4 oz (113.4 kg). Estimated body mass index is 45.71 kg/(m^2) as calculated from the following:    Height as of this encounter: 5' 2\" (1.575 m). -   Weight as of this encounter: 249 lb 14.4 oz (113.4 kg). - General: pleasant, no distress, good eye contact,obese  - HEENT: no pallor, no periorbital edema, EOMI  - Neck: supple, no thyromegaly  - Cardiovascular: regular, normal rate, normal S1 and S2  - Respiratory: clear to auscultation bilaterally  - Gastrointestinal: soft, nontender, nondistended,  BS +  - Musculoskeletal:  no acanthosis nigricans  - Foot exam -:lymphedema   - Neurological: alert and oriented  - Psychiatric: normal mood and affect  - Skin: color, texture, turgor normal.     Diabetic foot exam: February 2018    Left:    Vibratory sensation absent    Filament test absent sensation with micro filament   Pulse DP: 1 +    Deformities: Erythema, lymphedema,    Right:    Vibratory sensation absent   Filament test absent sensation with micro filament   Pulse DP: 1+                     Deformities: Callus, erythema, lymphedema    Data Reviewed:     [] Glucose records reviewed. [] See glucose records for details (to be scanned).   [] A1C  [x] Reviewed labs    Creatinine normal, A1 C  Lab Results   Component Value Date/Time    Hemoglobin A1c 10.4 (H) 01/10/2017 09:29 AM    Hemoglobin A1c 10.9 (H) 01/20/2015 09:52 AM    Glucose 269 (H) 01/10/2017 09:29 AM    Glucose (POC) 158 (H) 03/18/2015 12:08 PM    Glucose  02/13/2018 03:04 PM    Microalb/Creat ratio (ug/mg creat.) 34.2 (H) 01/10/2017 09:29 AM    LDL, calculated 139 (H) 01/10/2017 09:29 AM    Creatinine 1.03 (H) 01/10/2017 09:29 AM    Hemoglobin A1c, External 9.3 12/02/2015    Hemoglobin A1c, External 10.6 05/07/2015 12:39 PM      Lab Results   Component Value Date/Time    Cholesterol, total 204 (H) 01/10/2017 09:29 AM    HDL Cholesterol 38 (L) 01/10/2017 09:29 AM    LDL, calculated 139 (H) 01/10/2017 09:29 AM    Triglyceride 133 01/10/2017 09:29 AM     Lab Results   Component Value Date/Time    ALT (SGPT) 15 01/10/2017 09:29 AM    AST (SGOT) 22 01/10/2017 09:29 AM    Alk. phosphatase 98 01/10/2017 09:29 AM    Bilirubin, total 0.4 01/10/2017 09:29 AM     Lab Results   Component Value Date/Time    GFR est AA 62 01/10/2017 09:29 AM    GFR est non-AA 54 (L) 01/10/2017 09:29 AM    Creatinine 1.03 (H) 01/10/2017 09:29 AM    BUN 16 01/10/2017 09:29 AM    Sodium 139 01/10/2017 09:29 AM    Potassium 5.7 (H) 01/10/2017 09:29 AM    Chloride 97 01/10/2017 09:29 AM    CO2 27 01/10/2017 09:29 AM      No results found for: TSH, TSHEXT     Assessment/Plan:     1. Type 2 diabetes mellitus with hyperglycemia, with long-term current use of insulin ([address]    2. Mixed hyperlipidemia    3. Hypothyroidism due to acquired atrophy of thyroid    4. Essential hypertension        1. Type 2 Diabetes Mellitus with nephropathy,uncontrolled  Lab Results   Component Value Date/Time    Hemoglobin A1c 10.4 (H) 01/10/2017 09:29 AM    Hemoglobin A1c (POC) 7.2 08/23/2018 02:56 PM    Hemoglobin A1c, External 9.3 12/02/2015     Humalog or NovoLog 15 units before each meal  Levemir 50 units at night  Low carbohydrate diet        Advised to check glucose 3 - 4 times daily    2. HTN : Continue current therapy         3. Hyperlipidemia : Continue statin. 4.Obesity:Body mass index is 45.71 kg/(m^2). 5 PAD - PCI ,Lymphedema    6. Foot ulcer:  Followed by wound clinic        Patient Instructions   Continue Metformin      Levemir 50 units at 7 PM      Check sugars before meals     Humalog or Novolog fast acting 15 units before breakfast ,15  units before lunch and 15 units before dinner    No Novolog if sugars are less 70     Additional Humalog or Novolog for high sugars based on the scale    Blood sugar Fast acting insulin Breakfast/Lunch/Dinner     150-200  Add 2 Units       201-250  Add 4 Units       251-300  Add 6 Units       301-350  Add 8  Units        351-400  Add 10 Units           Bring medicine bottles to your visit     Follow-up Disposition:  Return in about 4 months (around 12/23/2018). Thank you for allowing me to participate in the care of this patient.     Clint Vides MD

## 2018-09-06 NOTE — TELEPHONE ENCOUNTER
----- Message from Jessica Monroy sent at 9/6/2018  2:28 PM EDT -----  Regarding: Dr. Kristine Banuelos (nurse aide) requesting refill for glucose strips- one touch ultra mini. [address] [phone]. Best contact [phone].

## 2019-01-01 ENCOUNTER — OFFICE VISIT (OUTPATIENT)
Dept: ENDOCRINOLOGY | Age: 79
End: 2019-01-01

## 2019-01-01 VITALS
SYSTOLIC BLOOD PRESSURE: 154 MMHG | DIASTOLIC BLOOD PRESSURE: 65 MMHG | BODY MASS INDEX: 44.55 KG/M2 | RESPIRATION RATE: 16 BRPM | OXYGEN SATURATION: 89 % | HEART RATE: 80 BPM | HEIGHT: 62 IN | WEIGHT: 242.1 LBS | TEMPERATURE: 95.7 F

## 2019-01-01 VITALS
OXYGEN SATURATION: 97 % | HEART RATE: 72 BPM | RESPIRATION RATE: 18 BRPM | HEIGHT: 62 IN | WEIGHT: 241 LBS | DIASTOLIC BLOOD PRESSURE: 37 MMHG | BODY MASS INDEX: 44.35 KG/M2 | SYSTOLIC BLOOD PRESSURE: 121 MMHG | TEMPERATURE: 95.8 F

## 2019-01-01 DIAGNOSIS — E78.2 MIXED HYPERLIPIDEMIA: ICD-10-CM

## 2019-01-01 DIAGNOSIS — E11.65 TYPE 2 DIABETES MELLITUS WITH HYPERGLYCEMIA, WITH LONG-TERM CURRENT USE OF INSULIN (HCC): Primary | ICD-10-CM

## 2019-01-01 DIAGNOSIS — I10 ESSENTIAL HYPERTENSION: ICD-10-CM

## 2019-01-01 DIAGNOSIS — E11.65 TYPE 2 DIABETES MELLITUS WITH HYPERGLYCEMIA, WITH LONG-TERM CURRENT USE OF INSULIN (HCC): ICD-10-CM

## 2019-01-01 DIAGNOSIS — G62.9 PERIPHERAL POLYNEUROPATHY: ICD-10-CM

## 2019-01-01 DIAGNOSIS — Z79.4 TYPE 2 DIABETES MELLITUS WITH HYPERGLYCEMIA, WITH LONG-TERM CURRENT USE OF INSULIN (HCC): ICD-10-CM

## 2019-01-01 DIAGNOSIS — Z79.4 TYPE 2 DIABETES MELLITUS WITH HYPERGLYCEMIA, WITH LONG-TERM CURRENT USE OF INSULIN (HCC): Primary | ICD-10-CM

## 2019-01-01 DIAGNOSIS — I73.9 PAD (PERIPHERAL ARTERY DISEASE) (HCC): ICD-10-CM

## 2019-01-01 LAB
ALBUMIN SERPL-MCNC: 3.6 G/DL (ref 3.5–4.8)
ALBUMIN/CREAT UR: 32.2 MG/G CREAT (ref 0–30)
ALBUMIN/GLOB SERPL: 1.2 {RATIO} (ref 1.2–2.2)
ALP SERPL-CCNC: 73 IU/L (ref 39–117)
ALT SERPL-CCNC: 8 IU/L (ref 0–32)
AST SERPL-CCNC: 15 IU/L (ref 0–40)
BILIRUB SERPL-MCNC: 0.5 MG/DL (ref 0–1.2)
BUN SERPL-MCNC: 64 MG/DL (ref 8–27)
BUN/CREAT SERPL: 28 (ref 12–28)
CALCIUM SERPL-MCNC: 9 MG/DL (ref 8.7–10.3)
CHLORIDE SERPL-SCNC: 100 MMOL/L (ref 96–106)
CHOLEST SERPL-MCNC: 145 MG/DL (ref 100–199)
CO2 SERPL-SCNC: 25 MMOL/L (ref 20–29)
CREAT SERPL-MCNC: 2.29 MG/DL (ref 0.57–1)
CREAT UR-MCNC: 161.7 MG/DL
EST. AVERAGE GLUCOSE BLD GHB EST-MCNC: 217 MG/DL
GLOBULIN SER CALC-MCNC: 3.1 G/DL (ref 1.5–4.5)
GLUCOSE POC: 230 MG/DL
GLUCOSE SERPL-MCNC: 197 MG/DL (ref 65–99)
HBA1C MFR BLD HPLC: 10 %
HBA1C MFR BLD: 9.2 % (ref 4.8–5.6)
HDLC SERPL-MCNC: 40 MG/DL
INTERPRETATION, 910389: NORMAL
INTERPRETATION: NORMAL
LDLC SERPL CALC-MCNC: 88 MG/DL (ref 0–99)
Lab: NORMAL
MICROALBUMIN UR-MCNC: 52 UG/ML
PDF IMAGE, 910387: NORMAL
POTASSIUM SERPL-SCNC: 4.7 MMOL/L (ref 3.5–5.2)
PROT SERPL-MCNC: 6.7 G/DL (ref 6–8.5)
SODIUM SERPL-SCNC: 140 MMOL/L (ref 134–144)
TRIGL SERPL-MCNC: 87 MG/DL (ref 0–149)
VLDLC SERPL CALC-MCNC: 17 MG/DL (ref 5–40)

## 2019-01-01 RX ORDER — TRAMADOL HYDROCHLORIDE 50 MG/1
50 TABLET ORAL
COMMUNITY

## 2019-01-03 NOTE — PROGRESS NOTES
Saran Red is a 70 y.o. female here for   Chief Complaint   Patient presents with    Diabetes       Functional glucose monitor and record keeping system? - log sheet   Eye exam within last year? - not recently  Foot exam within last year? - on file    1. Have you been to the ER, urgent care clinic since your last visit? Hospitalized since your last visit? - no    2. Have you seen or consulted any other health care providers outside of the [facility] since your last visit?   Include any pap smears or colon screening.- PCP

## 2019-01-03 NOTE — PROGRESS NOTES
Samira Eller MD          Patient Information  Date:1/3/2019  Name : Sari Calles 70 y.o.     YOB: 1940         Referred by: Sumi Grossman NP         Chief Complaint   Patient presents with    Diabetes       History of Present Illness: Sari Calles is a 70 y.o. female here for follow up of  Type 2 Diabetes Mellitus. type 2 diabetes, long-standing history of uncontrolled diabetes, complicated by peripheral neuropathy, foot ulcer  She is on ketogenic diet, stopped insulin on her own  The first month blood glucose was better but now she has blood glucose ranging from 200-270  A1c increased  Refuses to go back on insulin        February 26, 2018    She was hospitalized  for difficulty breathing, severe hyperglycemia, sister-in-law with the patient reported that she was not taking the insulin consistently. During the hospitalization the insulin has been decreased to 20 units of Levemir  Blood glucoses in 200s  She has home health now  Diet healthy  She had diabetic foot ulcer infection, was on IV antibiotics    Prior history February 2018   She had nonspecific side effects to multiple medications, Humalog was discontinued in the past as she c/o cramping in the legs and Trulicity was started. She is not taking Trulicity now as she ran out of the prescription. She also self stopped Levemir as it did not work. She is taking Humalog 20 units now before each meal, she has Humalog pens with her which was initially prescribed in September 2016. In the past she refused to take Humalog or even NovoLog  She is forgetting insulin.   She has checked her blood glucose and they are ranging from 300-400  Has foot pain, complains of redness, swelling    Prior history  Self changes insulin, follow-up with me is very poor, she was seen in January 2017 and November 2017  The regimen changes every time she follows up with me    She has preulcerative callus      In the past, she had mentioned being unable to afford a healthy diet and hence on a high carb diet. She has history of lymphedema and was followed by lymphedema clinic in the past    No regular exercise. Wt Readings from Last 3 Encounters:   01/03/19 242 lb 1.6 oz (109.8 kg)   08/23/18 249 lb 14.4 oz (113.4 kg)   03/30/18 247 lb 4.8 oz (112.2 kg)       BP Readings from Last 3 Encounters:   01/03/19 154/65   08/23/18 123/50   06/13/18 134/60           Past Medical History:   Diagnosis Date    HTN (hypertension)     Hyperlipidemia     Hypothyroid     Lymphedema     Type II or unspecified type diabetes mellitus with neurological manifestations, uncontrolled(250.62) (HCC)      Current Outpatient Medications   Medication Sig    glucose blood VI test strips (ONETOUCH ULTRA TEST) strip Test blood glucose 3 times daily Dx Code: E11.65    potassium chloride (K-DUR, KLOR-CON) 20 mEq tablet Take  by mouth two (2) times a day.  albuterol (PROVENTIL HFA, VENTOLIN HFA, PROAIR HFA) 90 mcg/actuation inhaler Take  by inhalation.  Blood-Glucose Meter (ONE TOUCH ULTRAMINI) monitoring kit Test blood glucose 3 times daily    Lancets misc Test blood glucose 3 times daily    furosemide (LASIX) 40 mg tablet Take 40-80 mg by mouth daily.  aspirin delayed-release 81 mg tablet Take 81 mg by mouth daily.  insulin lispro (HUMALOG) 100 unit/mL kwikpen Inject 15 units before each meal w/ SSI Max units daily: 75    insulin detemir U-100 (LEVEMIR FLEXTOUCH U-100 INSULN) 100 unit/mL (3 mL) inpn Inject 50 units at 7 pm.    Insulin Needles, Disposable, (BD ULTRA-FINE MINI PEN NEEDLE) 31 gauge x 3/16\" ndle Use to inject Levemir and Novolog QID Dx code E11.65    ondansetron hcl (ZOFRAN, AS HYDROCHLORIDE,) 4 mg tablet Take 4 mg by mouth every eight (8) hours as needed for Nausea.  carvedilol (COREG) 3.125 mg tablet Take  by mouth two (2) times daily (with meals).     atorvastatin (LIPITOR) 10 mg tablet Take  by mouth daily.  cilostazol (PLETAL) 50 mg tablet Take  by mouth Before breakfast and dinner.  lisinopril (PRINIVIL, ZESTRIL) 5 mg tablet Take 2.5 mg by mouth daily.  nystatin (MYCOSTATIN) powder Apply  to affected area two (2) times a day.  ammonium lactate (AMLACTIN) 12 % topical cream Apply  to affected area as needed. rub in to affected area well     metFORMIN (GLUCOPHAGE) 1,000 mg tablet Take 1 Tab by mouth two (2) times daily (with meals).  METOLAZONE PO Take  by mouth.  metoprolol succinate (TOPROL-XL) 25 mg XL tablet Take 25 mg by mouth daily    traMADol (ULTRAM) 50 mg tablet Take 50 mg by mouth every six (6) hours as needed for Pain.  meclizine (ANTIVERT) 25 mg tablet Take  by mouth three (3) times daily as needed.  trolamine salicylate (ASPERCREME) 10 % lotion Apply  to affected area.  FOLIC ACID/MULTIVIT-MIN/LUTEIN (CENTRUM SILVER PO) Take  by mouth.  naproxen (NAPROSYN) 250 mg tablet Take 250 mg by mouth two (2) times daily as needed for Pain.  Blood-Glucose Meter monitoring kit Test blood glucose 3 times daily    valsartan (DIOVAN) 80 mg tablet Take 80 mg by mouth daily.  pravastatin (PRAVACHOL) 40 mg tablet Take 40 mg by mouth nightly. No current facility-administered medications for this visit.       Allergies   Allergen Reactions    Lortab [Hydrocodone-Acetaminophen] Other (comments)     hallucinations    Phenergan [Promethazine] Other (comments)     Hallucination    Cephalexin Vertigo    Clindamycin Vertigo    Metolazone Other (comments)     Pt gets \"paralyzed\"    Oxycodone Other (comments)     hallucination         Review of Systems:  - Constitutional Symptoms: no fevers, no chills  - Eyes: no blurry vision no double vision  - Cardiovascular: no chest pain ,no palpitations  - Respiratory: no cough no shortness of breath  - Gastrointestinal: no dysphagia no  abdominal pain  - Musculoskeletal: no joint pains +  weakness  - Integumentary: no rashes  - Neurological: + numbness, tingling, no  headaches  -     Physical Examination:   Blood pressure 154/65, pulse 80, temperature 95.7 °F (35.4 °C), temperature source Oral, resp. rate 16, height 5' 2\" (1.575 m), weight 242 lb 1.6 oz (109.8 kg), SpO2 (!) 89 %. Estimated body mass index is 44.28 kg/m² as calculated from the following:    Height as of this encounter: 5' 2\" (1.575 m). -   Weight as of this encounter: 242 lb 1.6 oz (109.8 kg). - General: pleasant, no distress, good eye contact,obese  - HEENT: no pallor, no periorbital edema, EOMI  - Neck: supple, no thyromegaly  - Cardiovascular: regular, normal rate, normal S1 and S2  - Respiratory: clear to auscultation bilaterally  - Gastrointestinal: soft, nontender, nondistended,  BS +  - Musculoskeletal:  no acanthosis nigricans  - Foot exam -:lymphedema   - Neurological: alert and oriented  - Psychiatric: normal mood and affect  - Skin: color, texture, turgor normal.     Diabetic foot exam: February 2018    Left:    Vibratory sensation absent    Filament test absent sensation with micro filament   Pulse DP: 1 +    Deformities: Erythema, lymphedema,    Right:    Vibratory sensation absent   Filament test absent sensation with micro filament   Pulse DP: 1+                     Deformities: Callus, erythema, lymphedema    Data Reviewed:     [] Glucose records reviewed. [] See glucose records for details (to be scanned).   [] A1C  [x] Reviewed labs    Creatinine normal, A1 C  Lab Results   Component Value Date/Time    Hemoglobin A1c 10.4 (H) 01/10/2017 09:29 AM    Hemoglobin A1c 10.9 (H) 01/20/2015 09:52 AM    Glucose 269 (H) 01/10/2017 09:29 AM    Glucose (POC) 158 (H) 03/18/2015 12:08 PM    Glucose  01/03/2019 03:12 PM    Microalb/Creat ratio (ug/mg creat.) 34.2 (H) 01/10/2017 09:29 AM    LDL, calculated 139 (H) 01/10/2017 09:29 AM    Creatinine 1.03 (H) 01/10/2017 09:29 AM    Hemoglobin A1c, External 9.3 12/02/2015    Hemoglobin A1c, External 10.6 05/07/2015 12:39 PM      Lab Results   Component Value Date/Time    Cholesterol, total 204 (H) 01/10/2017 09:29 AM    HDL Cholesterol 38 (L) 01/10/2017 09:29 AM    LDL, calculated 139 (H) 01/10/2017 09:29 AM    Triglyceride 133 01/10/2017 09:29 AM     Lab Results   Component Value Date/Time    ALT (SGPT) 15 01/10/2017 09:29 AM    AST (SGOT) 22 01/10/2017 09:29 AM    Alk. phosphatase 98 01/10/2017 09:29 AM    Bilirubin, total 0.4 01/10/2017 09:29 AM     Lab Results   Component Value Date/Time    GFR est AA 62 01/10/2017 09:29 AM    GFR est non-AA 54 (L) 01/10/2017 09:29 AM    Creatinine 1.03 (H) 01/10/2017 09:29 AM    BUN 16 01/10/2017 09:29 AM    Sodium 139 01/10/2017 09:29 AM    Potassium 5.7 (H) 01/10/2017 09:29 AM    Chloride 97 01/10/2017 09:29 AM    CO2 27 01/10/2017 09:29 AM      No results found for: TSH, TSHEXT     Assessment/Plan:     1. Type 2 diabetes mellitus with hyperglycemia, with long-term current use of insulin ([address]    2. Mixed hyperlipidemia    3. Essential hypertension        1. Type 2 Diabetes Mellitus with nephropathy,uncontrolled  Lab Results   Component Value Date/Time    Hemoglobin A1c 10.4 (H) 01/10/2017 09:29 AM    Hemoglobin A1c (POC) 10.0 01/03/2019 03:18 PM    Hemoglobin A1c, External 9.3 12/02/2015   She was on Humalog or NovoLog 15 units before each meal,Levemir 50 units at night with better control of diabetes, A1c was 7.2 in August 2018  Self stopped insulin   On keto diet   Absolutely refused to go back on even small dose basal insulin  Has hyperglycemia  She wants to change the diet and if no improvement she will call within a month    Advised to check glucose 3 - 4 times daily    2. HTN : Continue current therapy         3. Hyperlipidemia : Continue statin. 4.Obesity:Body mass index is 44.28 kg/m². 5 PAD - PCI ,Lymphedema    6. Foot ulcer: Followed by wound clinic        There are no Patient Instructions on file for this visit.   Follow-up Disposition: Not on File    Thank you for allowing me to participate in the care of this patient.     Yancy Pratt MD    Patient verbalized understanding

## 2019-05-07 PROBLEM — D16.9: Status: ACTIVE | Noted: 2019-01-01

## 2019-05-07 PROBLEM — M77.40 METATARSALGIA: Status: ACTIVE | Noted: 2019-01-01

## 2019-05-07 NOTE — PROGRESS NOTES
Cassie Napier is a 70 y.o. female here for Chief Complaint Patient presents with  Diabetes  Diabetic Foot Exam  
 
Pt states she has a mouth full of sores and she has to get her teeth pulled because of an infection Functional glucose monitor and record keeping system? - yes Eye exam within last year? - has appt coming up Foot exam within last year? - due 1. Have you been to the ER, urgent care clinic since your last visit? Hospitalized since your last visit? [facility] for fluid build up 2. Have you seen or consulted any other health care providers outside of the [address] [place] [address] since your last visit? Include any pap smears or colon screening. -PCP

## 2019-05-07 NOTE — PATIENT INSTRUCTIONS
Stop Metformin Levemir 50 units at 7 PM   
 
Check sugars before meals Humalog or Novolog fast acting 15 units before breakfast ,15  units before lunch and 15 units before dinner  ,if you are eating a small meal then take only 8 units instead of 15 units No Novolog if sugars are less 70 Additional Humalog or Novolog for high sugars based on the scale Blood sugar Fast acting insulin Breakfast/Lunch/Dinner 150-200  Add 2 Units 201-250  Add 4 Units 251-300  Add 6 Units 301-350  Add 8  Units 351-400  Add 10 Units Bring medicine bottles to your visit

## 2019-05-07 NOTE — LETTER
5/8/19 Patient: Romeo Espitia YOB: 1940 Date of Visit: 5/7/2019 Leland Cobb NP 
[id number] [address] VIA Facsimile: [phone] Dear Leland Webb.ARACELIS, Thank you for referring Ms. Quoc Ramírez to [facility] for evaluation. My notes for this consultation are attached. If you have questions, please do not hesitate to call me. I look forward to following your patient along with you. Sincerely, Jade Cleaning MD

## 2019-05-07 NOTE — PROGRESS NOTES
[facility] Angeline Jackman MD 
 
 
 
 
Patient Information Date:5/7/2019 Name : Shae Galvez 70 y.o.    
YOB: 1940 Referred by: Ly Dowling., ARACELIS Chief Complaint Patient presents with  Diabetes  Diabetic Foot Exam  
 
 
History of Present Illness: Shae Galvez is a 70 y.o. female here for follow up of  Type 2 Diabetes Mellitus. type 2 diabetes, long-standing history of uncontrolled diabetes, complicated by peripheral neuropathy, foot ulcer She was on ketogenic diet, self stopped insulin, not on the diet anymore She is taking the insulin but sometimes forgets. She has a caregiver Oftentimes she is forgetting the basal insulin Blood glucose is fluctuating a lot No severe hypoglycemia February 26, 2018 She was hospitalized  for difficulty breathing, severe hyperglycemia, sister-in-law with the patient reported that she was not taking the insulin consistently. During the hospitalization the insulin has been decreased to 20 units of Levemir Blood glucoses in 200s She has home health now Diet healthy She had diabetic foot ulcer infection, was on IV antibiotics Prior history February 2018 She had nonspecific side effects to multiple medications, Humalog was discontinued in the past as she c/o cramping in the legs and Trulicity was started. She is not taking Trulicity now as she ran out of the prescription. She also self stopped Levemir as it did not work. She is taking Humalog 20 units now before each meal, she has Humalog pens with her which was initially prescribed in September 2016. In the past she refused to take Humalog or even NovoLog She is forgetting insulin. She has checked her blood glucose and they are ranging from 300-400 Has foot pain, complains of redness, swelling Prior history Self changes insulin, follow-up with me is very poor, she was seen in January 2017 and November 2017 The regimen changes every time she follows up with me She has preulcerative callus In the past, she had mentioned being unable to afford a healthy diet and hence on a high carb diet. She has history of lymphedema and was followed by lymphedema clinic in the past 
  No regular exercise. Wt Readings from Last 3 Encounters:  
05/07/19 241 lb (109.3 kg) 01/03/19 242 lb 1.6 oz (109.8 kg) 08/23/18 249 lb 14.4 oz (113.4 kg) BP Readings from Last 3 Encounters:  
05/07/19 (!) 121/37  
01/03/19 154/65  
08/23/18 123/50 Past Medical History:  
Diagnosis Date  
 HTN (hypertension)  Hyperlipidemia  Hypothyroid  Lymphedema  Type II or unspecified type diabetes mellitus with neurological manifestations, uncontrolled(250.62) ([facility] [address] [address] Current Outpatient Medications Medication Sig  
 traMADol (ULTRAM) 50 mg tablet Take 50 mg by mouth every six (6) hours as needed for Pain.  mometasone-formoterol (DULERA) 200-5 mcg/actuation HFA inhaler Take 2 Puffs by inhalation two (2) times a day.  glucose blood VI test strips (ONETOUCH ULTRA TEST) strip Test blood glucose 3 times daily Dx Code: E11.65  
 insulin lispro (HUMALOG) 100 unit/mL kwikpen Inject 15 units before each meal w/ SSI Max units daily: 75 (Patient taking differently: Inject 15 units before each meal w/ SSI Max units daily: 75 PRN)  insulin detemir U-100 (LEVEMIR FLEXTOUCH U-100 INSULN) 100 unit/mL (3 mL) inpn Inject 50 units at 7 pm. (Patient taking differently: Inject 50 units at 7 pm. PRN)  Insulin Needles, Disposable, (BD ULTRA-FINE MINI PEN NEEDLE) 31 gauge x 3/16\" ndle Use to inject Levemir and Novolog QID Dx code E11.65  
 ondansetron hcl (ZOFRAN, AS HYDROCHLORIDE,) 4 mg tablet Take 4 mg by mouth every eight (8) hours as needed for Nausea.  potassium chloride (K-DUR, KLOR-CON) 20 mEq tablet Take 40 mEq by mouth two (2) times a day.  atorvastatin (LIPITOR) 10 mg tablet Take 10 mg by mouth nightly.  cilostazol (PLETAL) 50 mg tablet Take  by mouth Before breakfast and dinner.  lisinopril (PRINIVIL, ZESTRIL) 5 mg tablet Take 5 mg by mouth daily.  Blood-Glucose Meter (ONE TOUCH ULTRAMINI) monitoring kit Test blood glucose 3 times daily  Lancets misc Test blood glucose 3 times daily  furosemide (LASIX) 40 mg tablet Take 40-80 mg by mouth two (2) times a day.  aspirin delayed-release 81 mg tablet Take 81 mg by mouth daily.  carvedilol (COREG) 3.125 mg tablet Take  by mouth two (2) times daily (with meals).  nystatin (MYCOSTATIN) powder Apply  to affected area two (2) times a day.  albuterol (PROVENTIL HFA, VENTOLIN HFA, PROAIR HFA) 90 mcg/actuation inhaler Take  by inhalation.  metFORMIN (GLUCOPHAGE) 1,000 mg tablet Take 1 Tab by mouth two (2) times daily (with meals). No current facility-administered medications for this visit. Allergies Allergen Reactions  Lortab [Hydrocodone-Acetaminophen] Other (comments)  
  hallucinations  Phenergan [Promethazine] Other (comments) Hallucination  Cephalexin Vertigo  Clindamycin Vertigo  Metolazone Other (comments) Pt gets \"paralyzed\"  Oxycodone Other (comments)  
  hallucination Review of Systems: 
- Constitutional Symptoms: no fevers, no chills - Eyes: no blurry vision no double vision - Cardiovascular: no chest pain ,no palpitations - Respiratory: no cough no shortness of breath 
- Gastrointestinal: no dysphagia no  abdominal pain - Musculoskeletal: no joint pains +  weakness - Integumentary: no rashes - Neurological: + numbness, tingling, no  headaches - Physical Examination: 
 Blood pressure (!) 121/37, pulse 72, temperature 95.8 °F (35.4 °C), temperature source Oral, resp. rate 18, height 5' 2\" (1.575 m), weight 241 lb (109.3 kg), SpO2 97 %. Estimated body mass index is 44.08 kg/m² as calculated from the following: Height as of this encounter: 5' 2\" (1.575 m). -   Weight as of this encounter: 241 lb (109.3 kg). - General: pleasant, no distress, good eye contact,obese 
- HEENT: no pallor, no periorbital edema, EOMI 
- Neck: supple, no thyromegaly - Cardiovascular: regular, normal rate, normal S1 and S2 
- Respiratory: clear to auscultation bilaterally - Gastrointestinal: soft, nontender, nondistended,  BS + 
- Musculoskeletal:  no acanthosis nigricans - Foot exam -:lymphedema  
- Neurological: alert and oriented - Psychiatric: normal mood and affect 
- Skin: color, texture, turgor normal.  
 
Diabetic foot exam: May 2019 Left:  
 Vibratory sensation absent Filament test absent sensation with micro filament Pulse DP: 1 + Deformities: Erythema, lymphedema, 
 
Right:  
 Vibratory sensation absent Filament test absent sensation with micro filament Pulse DP: 1+ Deformities: Callus, erythema, lymphedema Data Reviewed:  
 
[] Glucose records reviewed. [] See glucose records for details (to be scanned). [] A1C [x] Reviewed labs Creatinine normal, A1 C Lab Results Component Value Date/Time Hemoglobin A1c 9.2 (H) 05/01/2019 11:06 AM  
 Hemoglobin A1c 10.4 (H) 01/10/2017 09:29 AM  
 Hemoglobin A1c 10.9 (H) 01/20/2015 09:52 AM  
 Glucose 197 (H) 05/01/2019 11:06 AM  
 Glucose (POC) 158 (H) 03/18/2015 12:08 PM  
 Glucose  01/03/2019 03:12 PM  
 Microalb/Creat ratio (ug/mg creat.) 32.2 (H) 05/01/2019 11:06 AM  
 LDL, calculated 88 05/01/2019 11:06 AM  
 Creatinine 2.29 (H) 05/01/2019 11:06 AM  
 Hemoglobin A1c, External 9.3 12/02/2015 Hemoglobin A1c, External 10.6 05/07/2015 12:39 PM  
  
Lab Results Component Value Date/Time Cholesterol, total 145 05/01/2019 11:06 AM  
 HDL Cholesterol 40 05/01/2019 11:06 AM  
 LDL, calculated 88 05/01/2019 11:06 AM  
 Triglyceride 87 05/01/2019 11:06 AM  
 
Lab Results Component Value Date/Time  ALT (SGPT) 8 05/01/2019 11:06 AM  
 AST (SGOT) 15 05/01/2019 11:06 AM  
 Alk. phosphatase 73 05/01/2019 11:06 AM  
 Bilirubin, total 0.5 05/01/2019 11:06 AM  
 
Lab Results Component Value Date/Time GFR est AA 23 (L) 05/01/2019 11:06 AM  
 GFR est non-AA 20 (L) 05/01/2019 11:06 AM  
 Creatinine 2.29 (H) 05/01/2019 11:06 AM  
 BUN 64 (H) 05/01/2019 11:06 AM  
 Sodium 140 05/01/2019 11:06 AM  
 Potassium 4.7 05/01/2019 11:06 AM  
 Chloride 100 05/01/2019 11:06 AM  
 CO2 25 05/01/2019 11:06 AM  
  
No results found for: TSH, TSHEXT Assessment/Plan: 1. Type 2 diabetes mellitus with hyperglycemia, with long-term current use of insulin (HCC) 2. Essential hypertension 3. Mixed hyperlipidemia 1. Type 2 Diabetes Mellitus with nephropathy, PAD Lab Results Component Value Date/Time Hemoglobin A1c 9.2 (H) 05/01/2019 11:06 AM  
 Hemoglobin A1c (POC) 10.0 01/03/2019 03:18 PM  
 Hemoglobin A1c, External 9.3 12/02/2015 She was on Humalog or NovoLog 15 units before each meal,Levemir 50 units at night with better control of diabetes, A1c was 7.2 in August 2018 Forgetting insulin, discussed with the caregiver,  that she needs help. Risk for hypoglycemia is high Levemir 50 units at night NovoLog 15 units before each meal 
Advised to check glucose 3 - 4 times daily 2. HTN : Continue current therapy 3. Hyperlipidemia : Continue statin. 4.Obesity:Body mass index is 44.08 kg/m². 5 PAD - PCI ,Lymphedema 6. Foot ulcer: Followed by wound clinic Patient Instructions Continue Metformin Levemir 50 units at 7 PM   
 
Check sugars before meals Humalog or Novolog fast acting 15 units before breakfast ,15  units before lunch and 15 units before dinner No Novolog if sugars are less 70 Additional Humalog or Novolog for high sugars based on the scale Blood sugar Fast acting insulin Breakfast/Lunch/Dinner 150-200  Add 2 Units 201-250  Add 4 Units 251-300  Add 6 Units 301-350  Add 8  Units 351-400  Add 10 Units Bring medicine bottles to your visit Thank you for allowing me to participate in the care of this patient. Ed Rios MD 
 
Patient verbalized understanding